# Patient Record
Sex: FEMALE | Race: WHITE | NOT HISPANIC OR LATINO | Employment: FULL TIME | URBAN - METROPOLITAN AREA
[De-identification: names, ages, dates, MRNs, and addresses within clinical notes are randomized per-mention and may not be internally consistent; named-entity substitution may affect disease eponyms.]

---

## 2017-01-01 PROBLEM — L23.1 CONTACT DERMATITIS DUE TO ADHESIVE BANDAGE: Status: ACTIVE | Noted: 2017-01-01

## 2017-01-03 ENCOUNTER — TELEPHONE (OUTPATIENT)
Dept: OBSTETRICS AND GYNECOLOGY | Facility: CLINIC | Age: 32
End: 2017-01-03

## 2017-01-03 NOTE — TELEPHONE ENCOUNTER
Pt delivered via c/s on 12/29. Pt is calling to make 2 week follow up appt in [facility] but nothing is available for Dr. Woodard until the 25th. Pt is requesting to be seen sooner.  [phone]

## 2017-01-05 ENCOUNTER — TELEPHONE (OUTPATIENT)
Dept: LACTATION | Facility: CLINIC | Age: 32
End: 2017-01-05

## 2017-01-05 NOTE — TELEPHONE ENCOUNTER
Lactation note: pt is able to latch baby to breast at every feeding with the use of nipple shield. Pt nurses at least 8 times daily and has more than 5 urine and 3 large stools daily. Pt has been pumping 4 times daily. Baby has ped appointment on 1/6/2016. Pt feels plan is working well and she will continue for now. Lc will call next week to check on progress. Pt has lactation contact number.

## 2017-01-10 ENCOUNTER — TELEPHONE (OUTPATIENT)
Dept: LACTATION | Facility: CLINIC | Age: 32
End: 2017-01-10

## 2017-01-10 NOTE — TELEPHONE ENCOUNTER
"Lactation note: Patient reports baby is latching and nursing well 8 or more feedings/24 hours with use of nipple shield. She states she is "working on" trying to get baby to latch without shield but has not been successful. Baby has > 5 stools and at least 6 urine diapers per 24 hours. Patient is pumping 4 times a day and storing breastmilk. Baby is not being supplemented. At the pediatrician baby weighed 9-0 with clean diaper on only. Birth weight 8-11.3 Suggestions given for weaning baby off of shield. Has warm line number to call for further assistance and questions.   "

## 2017-01-11 ENCOUNTER — POSTPARTUM VISIT (OUTPATIENT)
Dept: OBSTETRICS AND GYNECOLOGY | Facility: CLINIC | Age: 32
End: 2017-01-11
Attending: OBSTETRICS & GYNECOLOGY
Payer: COMMERCIAL

## 2017-01-11 VITALS — SYSTOLIC BLOOD PRESSURE: 118 MMHG | DIASTOLIC BLOOD PRESSURE: 74 MMHG | BODY MASS INDEX: 37.52 KG/M2 | WEIGHT: 239.5 LBS

## 2017-01-11 PROCEDURE — 99999 PR PBB SHADOW E&M-EST. PATIENT-LVL II: CPT | Mod: PBBFAC,,, | Performed by: OBSTETRICS & GYNECOLOGY

## 2017-01-11 PROCEDURE — 99024 POSTOP FOLLOW-UP VISIT: CPT | Mod: S$GLB,,, | Performed by: OBSTETRICS & GYNECOLOGY

## 2017-01-11 NOTE — PROGRESS NOTES
Subjective:   Patient reports no nausea or vomiting.    Activity level: Normal.    Pain control: Well controlled.    Reports some depression. Has a therapist and a psychiatrist already established prior to pregnancy. She has spoken with both of them sicne delivery. No meds have been increased. No SI, no HI. Overall doing better than she was doing right after delivery. No physical complaints, all issues are mental/psych issues. Rarely home alone with the baby.  is around and mother. All questions answered. I do not think she is a threat to the baby or herself. Consider increasing meds pysch    Objective:  Vitals:    17 1453   BP: 118/74     General appearance: Comfortable and well-appearing.    Abdomen: Abdomen is soft, non distended   Tenderness: There is no abdominal tenderness.    Wound:  Clean.  There is no dehiscence.  There is no drainage.      Assessment:   s/p  delivery- 2 week post op  Condition: In stable condition.     Plan:  Encourage ambulation.  Continue wound care.  Pelvic rest for 6 weeks postpartum.    F/u in 2 weeks to reeval depression

## 2017-01-26 ENCOUNTER — POSTPARTUM VISIT (OUTPATIENT)
Dept: OBSTETRICS AND GYNECOLOGY | Facility: CLINIC | Age: 32
End: 2017-01-26
Attending: OBSTETRICS & GYNECOLOGY
Payer: COMMERCIAL

## 2017-01-26 VITALS
SYSTOLIC BLOOD PRESSURE: 122 MMHG | WEIGHT: 240.63 LBS | HEIGHT: 67 IN | BODY MASS INDEX: 37.77 KG/M2 | DIASTOLIC BLOOD PRESSURE: 78 MMHG

## 2017-01-26 PROCEDURE — 99024 POSTOP FOLLOW-UP VISIT: CPT | Mod: S$GLB,,, | Performed by: OBSTETRICS & GYNECOLOGY

## 2017-01-26 PROCEDURE — 99999 PR PBB SHADOW E&M-EST. PATIENT-LVL II: CPT | Mod: PBBFAC,,, | Performed by: OBSTETRICS & GYNECOLOGY

## 2017-01-26 NOTE — MR AVS SNAPSHOT
[facility]  [address]  [address]  [facility] [address]  Phone: [phone]  Fax: [phone]                  Ana Johnson   2017 3:15 PM   Postpartum Visit    Description:  Female : 1985   Provider:  Erica Woodard MD   Department:  [facility]           Reason for Visit     Postpartum Care           Diagnoses this Visit        Comments    Encounter for postpartum visit    -  Primary            To Do List           Future Appointments        Provider Department Dept Phone    2017 1:15 PM Erica [facility]ard MD [facility] [phone]      Goals (5 Years of Data)     None      Follow-Up and Disposition     Return in about 2 weeks (around 2017) for Postpartum visit.      [facility]     [facility] -  Assistance  Registered nurses in the [facility] provide clinical advisement, health education, appointment booking, and other advisory services.  Call for this free service at [phone].             Medications           Message regarding Medications     Verify the changes and/or additions to your medication regime listed below are the same as discussed with your clinician today.  If any of these changes or additions are incorrect, please notify your healthcare provider.             Verify that the below list of medications is an accurate representation of the medications you are currently taking.  If none reported, the list may be blank. If incorrect, please contact your healthcare provider. Carry this list with you in case of emergency.           Current Medications     aripiprazole (ABILIFY) 2 MG Tab Take 3 mg by mouth once daily.     blood sugar diagnostic Strp ONE TOUCH VERIO BLOOD GLUCOSE TEST STRIPS & DELICA LANCETS. CHECK BLOOD SUGARS 4 TIMES A DAY.    blood sugar diagnostic Strp Take blood sugars 4x daily, fasting and 2 hours post-meals    labetalol (NORMODYNE) 100 MG tablet Take 2 tablets  (200 mg total) by mouth 2 (two) times daily.    metformin (GLUCOPHAGE) 500 MG tablet Take 1 tablet (500 mg total) by mouth once daily. At HS    nifedipine 30 MG ORAL TR24 (PROCARDIA-XL) 30 MG (OSM) 24 hr tablet Take 1 tablet (30 mg total) by mouth once daily.    ONETOUCH DELICA LANCETS 33 gauge Misc CHECK 4 TIMES PER DAY    venlafaxine (EFFEXOR-XR) 150 MG Cp24 Take 2 capsules (300 mg total) by mouth every morning.    oxycodone-acetaminophen (PERCOCET) 5-325 mg per tablet Take 1 tablet by mouth every 4 (four) hours as needed.           Clinical Reference Information           Prenatal Vitals     Enc. Date GA Prenatal Vitals Prenatal Pulse Pain Level Urine Albumin/Glucose Edema Presentation Dilation/Effacement/Station    1/26/17 39w2d 122/78 / 109.1 kg (240 lb 10.1 oz)           1/11/17 39w2d 118/74 / 108.7 kg (239 lb 8.5 oz)           12/29/16 39w2d Admission Dept: [facility] L&D    12/28/16 39w1d Admission Dept: [facility]    12/27/16 39w0d  / 120.4 kg (265 lb 5.2 oz)           12/27/16 39w0d 120/84 / 120.4 kg (265 lb 5.2 oz)    Trace / Negative None / None / None Vertex 1 / 50 / -3    12/19/16 37w6d 120/82 / 122.1 kg (269 lb 1.5 oz)  / 154   Negative / Negative       12/15/16 37w2d 112/70   0 Negative / Negative None / None / None      12/8/16 36w2d 114/74 / 121.6 kg (268 lb 1.3 oz)    Negative / Negative   0 / 0 / -3    12/2/16 35w3d 124/80 / 121.1 kg (266 lb 15.6 oz)           12/1/16 35w2d 142/80 (A) / 122.3 kg (269 lb 8.2 oz)    Negative / Negative       11/25/16 34w3d 122/74 / 120.9 kg (266 lb 8.6 oz) 35 cm / 132-144 / Present  0        11/17/16 33w2d 148/82 (A)*                          *BP:  barbara repeat    11/17/16 33w2d 154/82 (A) / 121.6 kg (268 lb 1.3 oz) 34.5 cm / 150 / Present  0 Negative / Negative None / None / None      11/10/16 32w2d 126/78 / 122.2 kg (269 lb 6.4 oz)           11/3/16 31w2d 130/82 / 122 kg (268 lb 15.4 oz)  /  / Present  0 Negative / Negative None / None / None      10/20/16 29w2d  "116/68 / 122 kg (268 lb 15.4 oz) 30 cm / 145 / Present  0 Negative / Negative None / None / None      10/11/16 28w0d Admission Dx: Gestational diabetes mellitus (GDM) Dept: [facility] OB ASSE    10/11/16 28w0d 133/80 / 121.4 kg (267 lb 10.2 oz)           10/5/16 27w1d 132/80 / 122.7 kg (270 lb 6.3 oz) 29 cm / 142   Negative / Negative       10/4/16 27w0d 128/70 / 121.8 kg (268 lb 8.3 oz)  / 142-151 / Present          16 26w0d 122/78 / 122 kg (268 lb 15.4 oz) 28 cm / 130-142*  0                       *Fetal Heart Rate:  Fetal Ultrasound    9/15/16 24w2d 142/62 (A) / 122 kg (269 lb 1.1 oz)  / 147  0 Negative / Negative       16 24w1d 126/72 / 122.4 kg (269 lb 13.5 oz)           16 20w1d 142/84 (A) / 122.4 kg (269 lb 15.3 oz)  / 155  0 Negative / Negative       16 19w0d 124/80 / 121.2 kg (267 lb 3.2 oz)           16 16w1d 124/78 / 121.4 kg (267 lb 10.2 oz)  /  / Absent  0 Negative / Negative None / None / None      16 11w6d 130/78 / 121.1 kg (267 lb)  / 150 / Absent  0 Negative / Negative None / None / None / No      16 8w1d 150/88 (A) / 93.9 kg (207 lb)   0 Negative / Negative          TW.5 kg (58 lb 5.2 oz)   Pregravid weight: 93.9 kg (207 lb)   Number of babies: 1   Height: 5' 7" (1.702 m)   BMI: 32.4       Vital Signs - Last Recorded  Most recent update: 2017  3:23 PM by Shanice Espino MA    BP Ht Wt LMP BMI    122/78 5' 7" (1.702 m) 109.1 kg (240 lb 10.1 oz) 2016 (Exact Date) 37.69 kg/m2      Allergies as of 2017     No Known Allergies      Immunizations Administered on Date of Encounter - 2017     None      "

## 2017-01-26 NOTE — PROGRESS NOTES
Subjective:   Patient reports no nausea or vomiting.    Activity level: Normal.    Pain control: Well controlled.    Reports no postpartum depression, overall doing well.     Objective:  Vitals:    17 1521   BP: 122/78     General appearance: Comfortable and well-appearing.    Abdomen: Abdomen is soft, non distended   Tenderness: There is no abdominal tenderness.    Wound:  Clean.  There is no dehiscence.  There is no drainage.      Assessment:   s/p  delivery- 4 week post op  CHTN- on Labetolol 200 BID and Procardia 30 XL  DM Type 2- on glucophage 500 po QD-- some accuchecks fasting at in 110's-115  Condition: In stable condition.     Plan:  Ok to stop the labetolol and continue the procardia  Increase Glucophage to 500 mg BID  Encourage ambulation.  Continue wound care.  Pelvic rest for 6 weeks postpartum.

## 2017-02-09 ENCOUNTER — POSTPARTUM VISIT (OUTPATIENT)
Dept: OBSTETRICS AND GYNECOLOGY | Facility: CLINIC | Age: 32
End: 2017-02-09
Attending: OBSTETRICS & GYNECOLOGY
Payer: COMMERCIAL

## 2017-02-09 VITALS
BODY MASS INDEX: 37.49 KG/M2 | WEIGHT: 238.88 LBS | SYSTOLIC BLOOD PRESSURE: 118 MMHG | DIASTOLIC BLOOD PRESSURE: 72 MMHG | HEIGHT: 67 IN

## 2017-02-09 PROCEDURE — 0503F POSTPARTUM CARE VISIT: CPT | Mod: S$GLB,,, | Performed by: OBSTETRICS & GYNECOLOGY

## 2017-02-09 PROCEDURE — 99999 PR PBB SHADOW E&M-EST. PATIENT-LVL II: CPT | Mod: PBBFAC,,, | Performed by: OBSTETRICS & GYNECOLOGY

## 2017-02-09 NOTE — MR AVS SNAPSHOT
[facility]  [address]  [address]  [facility] [address]  Phone: [phone]  Fax: [phone]                  Ana Johnson   2017 1:15 PM   Postpartum Visit    Description:  Female : 1985   Provider:  Erica Woodard MD   Department:  [facility]           Reason for Visit     Postpartum Care                To Do List           Future Appointments        Provider Department Dept Phone    2017 3:30 PM Erica Woodard MD [facility] [phone]      Goals (5 Years of Data)     None      [facility]     [facility] Nurse Care Line -  Assistance  Registered nurses in the [facility] Call Center provide clinical advisement, health education, appointment booking, and other advisory services.  Call for this free service at [phone].             Medications           Message regarding Medications     Verify the changes and/or additions to your medication regime listed below are the same as discussed with your clinician today.  If any of these changes or additions are incorrect, please notify your healthcare provider.             Verify that the below list of medications is an accurate representation of the medications you are currently taking.  If none reported, the list may be blank. If incorrect, please contact your healthcare provider. Carry this list with you in case of emergency.           Current Medications     aripiprazole (ABILIFY) 2 MG Tab Take 3 mg by mouth once daily.     blood sugar diagnostic Strp ONE TOUCH VERIO BLOOD GLUCOSE TEST STRIPS & DELICA LANCETS. CHECK BLOOD SUGARS 4 TIMES A DAY.    blood sugar diagnostic Strp Take blood sugars 4x daily, fasting and 2 hours post-meals    labetalol (NORMODYNE) 100 MG tablet Take 2 tablets (200 mg total) by mouth 2 (two) times daily.    metformin (GLUCOPHAGE) 500 MG tablet Take 1 tablet (500 mg total) by mouth once daily. At HS    nifedipine 30 MG ORAL TR24 (PROCARDIA-XL) 30 MG  (OSM) 24 hr tablet Take 1 tablet (30 mg total) by mouth once daily.    ONETOUCH DELICA LANCETS 33 gauge Misc CHECK 4 TIMES PER DAY    oxycodone-acetaminophen (PERCOCET) 5-325 mg per tablet Take 1 tablet by mouth every 4 (four) hours as needed.    venlafaxine (EFFEXOR-XR) 150 MG Cp24 Take 2 capsules (300 mg total) by mouth every morning.           Clinical Reference Information           Prenatal Vitals     Enc. Date GA Prenatal Vitals Prenatal Pulse Pain Level Urine Albumin/Glucose Edema Presentation Dilation/Effacement/Station    2/9/17 39w2d 118/72 / 108.3 kg (238 lb 13.9 oz)           1/26/17 39w2d 122/78 / 109.1 kg (240 lb 10.1 oz)           1/11/17 39w2d 118/74 / 108.7 kg (239 lb 8.5 oz)           12/29/16 39w2d Admission Dept: [facility] L&D    12/28/16 39w1d Admission Dept: [facility]    12/27/16 39w0d  / 120.4 kg (265 lb 5.2 oz)           12/27/16 39w0d 120/84 / 120.4 kg (265 lb 5.2 oz)    Trace / Negative None / None / None Vertex 1 / 50 / -3    12/19/16 37w6d 120/82 / 122.1 kg (269 lb 1.5 oz)  / 154   Negative / Negative       12/15/16 37w2d 112/70   0 Negative / Negative None / None / None      12/8/16 36w2d 114/74 / 121.6 kg (268 lb 1.3 oz)    Negative / Negative   0 / 0 / -3    12/2/16 35w3d 124/80 / 121.1 kg (266 lb 15.6 oz)           12/1/16 35w2d 142/80 (A) / 122.3 kg (269 lb 8.2 oz)    Negative / Negative       11/25/16 34w3d 122/74 / 120.9 kg (266 lb 8.6 oz) 35 cm / 132-144 / Present  0        11/17/16 33w2d 148/82 (A)*                          *BP:  barbara repeat    11/17/16 33w2d 154/82 (A) / 121.6 kg (268 lb 1.3 oz) 34.5 cm / 150 / Present  0 Negative / Negative None / None / None      11/10/16 32w2d 126/78 / 122.2 kg (269 lb 6.4 oz)           11/3/16 31w2d 130/82 / 122 kg (268 lb 15.4 oz)  /  / Present  0 Negative / Negative None / None / None      10/20/16 29w2d 116/68 / 122 kg (268 lb 15.4 oz) 30 cm / 145 / Present  0 Negative / Negative None / None / None      10/11/16 28w0d Admission Dx:  "Gestational diabetes mellitus (GDM) Dept: [facility] OB ASSE    10/11/16 28w0d 133/80 / 121.4 kg (267 lb 10.2 oz)           10/5/16 27w1d 132/80 / 122.7 kg (270 lb 6.3 oz) 29 cm / 142   Negative / Negative       10/4/16 27w0d 128/70 / 121.8 kg (268 lb 8.3 oz)  / 142-151 / Present          16 26w0d 122/78 / 122 kg (268 lb 15.4 oz) 28 cm / 130-142*  0                       *Fetal Heart Rate:  Fetal Ultrasound    9/15/16 24w2d 142/62 (A) / 122 kg (269 lb 1.1 oz)  / 147  0 Negative / Negative       16 24w1d 126/72 / 122.4 kg (269 lb 13.5 oz)           16 20w1d 142/84 (A) / 122.4 kg (269 lb 15.3 oz)  / 155  0 Negative / Negative       16 19w0d 124/80 / 121.2 kg (267 lb 3.2 oz)           16 16w1d 124/78 / 121.4 kg (267 lb 10.2 oz)  /  / Absent  0 Negative / Negative None / None / None      16 11w6d 130/78 / 121.1 kg (267 lb)  / 150 / Absent  0 Negative / Negative None / None / None / No      16 8w1d 150/88 (A) / 93.9 kg (207 lb)   0 Negative / Negative          TW.5 kg (58 lb 5.2 oz)   Pregravid weight: 93.9 kg (207 lb)   Number of babies: 1   Height: 5' 7" (1.702 m)   BMI: 32.4       Your Vitals Were     BP Height Weight Last Period BMI    118/72 5' 7" (1.702 m) 108.3 kg (238 lb 13.9 oz) 2016 (Exact Date) 37.41 kg/m2      Allergies as of 2017     No Known Allergies      Immunizations Administered on Date of Encounter - 2017     None      Language Assistance Services     ATTENTION: Language assistance services are available, free of charge. Please call [phone].      ATENCIÓN: Si habla español, tiene a sheikh disposición servicios gratuitos de asistencia lingüística. Llame al [phone].     CHÚ Ý: N?u b?n nói Ti?ng Vi?t, có các d?ch v? h? tr? ngôn ng? mi?n phí dành cho b?n. G?i s? [phone].         [facility] complies with applicable Federal civil rights laws and does not discriminate on the basis of race, color, national origin, age, disability, or " sex.

## 2017-02-09 NOTE — PROGRESS NOTES
"Subjective:       Ana Johnson is a 31 y.o. female who presents for a postpartum visit. She is 6 weeks postpartum following a . I have fully reviewed the prenatal and intrapartum course. The delivery was at 39 gestational weeks. Anesthesia: epidural. Postpartum course has been uncomplicated. Baby's course has been uncomplicated. Bleeding no bleeding. Bowel function is normal. Bladder function is normal. Patient is not sexually active. Contraception method is none. Postpartum depression screening: negative.      The patient's history were reviewed and updated.    Review of Systems  Review of Systems       Objective:        Visit Vitals    /72    Ht 5' 7" (1.702 m)    Wt 108.3 kg (238 lb 13.9 oz)    LMP 2016 (Exact Date)    Breastfeeding Yes    BMI 37.41 kg/m2      General:  alert and cooperative   Abdomen: soft, non-tender; bowel sounds normal; no masses,  no organomegaly Incision- intact, healing well, no sign of infection      Vulva:  normal   Vagina: normal vagina, no discharge, exudate, lesion, or erythema   Cervix:  no lesions   Corpus: normal size, contour, position, consistency, mobility, non-tender   Adnexa:  no mass, fullness, tenderness   Rectal Exam: Not performed.          Assessment:      6 week postpartum exam. Pap smear not done at today's visit.     Plan:      1. Contraception: none  2. Follow up for annual in 3 months  "

## 2017-02-28 ENCOUNTER — PATIENT MESSAGE (OUTPATIENT)
Dept: OBSTETRICS AND GYNECOLOGY | Facility: CLINIC | Age: 32
End: 2017-02-28

## 2017-03-01 RX ORDER — METFORMIN HYDROCHLORIDE 500 MG/1
500 TABLET ORAL 2 TIMES DAILY WITH MEALS
Qty: 60 TABLET | Refills: 2 | Status: SHIPPED | OUTPATIENT
Start: 2017-03-01 | End: 2017-05-29 | Stop reason: SDUPTHER

## 2017-03-29 ENCOUNTER — PATIENT MESSAGE (OUTPATIENT)
Dept: OBSTETRICS AND GYNECOLOGY | Facility: CLINIC | Age: 32
End: 2017-03-29

## 2017-05-05 ENCOUNTER — OFFICE VISIT (OUTPATIENT)
Dept: INTERNAL MEDICINE | Facility: CLINIC | Age: 32
End: 2017-05-05
Payer: COMMERCIAL

## 2017-05-05 VITALS
SYSTOLIC BLOOD PRESSURE: 140 MMHG | BODY MASS INDEX: 38.44 KG/M2 | TEMPERATURE: 99 F | HEIGHT: 67 IN | HEART RATE: 103 BPM | DIASTOLIC BLOOD PRESSURE: 100 MMHG | WEIGHT: 244.94 LBS | RESPIRATION RATE: 18 BRPM

## 2017-05-05 DIAGNOSIS — R73.09 ELEVATED GLUCOSE: Chronic | ICD-10-CM

## 2017-05-05 DIAGNOSIS — F32.89 OTHER DEPRESSION: Chronic | ICD-10-CM

## 2017-05-05 DIAGNOSIS — Z00.00 ANNUAL PHYSICAL EXAM: Primary | ICD-10-CM

## 2017-05-05 DIAGNOSIS — I10 ESSENTIAL HYPERTENSION: ICD-10-CM

## 2017-05-05 PROCEDURE — 99999 PR PBB SHADOW E&M-EST. PATIENT-LVL III: CPT | Mod: PBBFAC,,, | Performed by: INTERNAL MEDICINE

## 2017-05-05 PROCEDURE — 99395 PREV VISIT EST AGE 18-39: CPT | Mod: S$GLB,,, | Performed by: INTERNAL MEDICINE

## 2017-05-05 RX ORDER — NIFEDIPINE 60 MG/1
60 TABLET, EXTENDED RELEASE ORAL DAILY
Qty: 30 TABLET | Refills: 11 | Status: SHIPPED | OUTPATIENT
Start: 2017-05-05 | End: 2017-05-10 | Stop reason: SDUPTHER

## 2017-05-05 NOTE — PROGRESS NOTES
Subjective:       Patient ID: Ana Johnson is a 31 y.o. female.    Chief Complaint: Annual Exam and recent pregnancy and had gestational diabetes    HPI     31 y.o. female here for annual exam.     Cholesterol: needs  Vaccines: Influenza - done; Tetanus - 2016  Sexual Screening:   STD screening: no concern  Eye exam: glasses; done last 2 yrs ago  Mammogram: no family history of cancer  Gyn exam: UTD  Colonoscopy: no family history of cancer  A1c: needs    Exercise: She walks three times a week.  Diet:  Mostly home cooked.  Tries to get her veges in.  Has been constipated since her pregnancy.  Drinks coffee and water.    Constipation - happens every couple of days.  Before she was pregnant, she was once a day.  She was exercising more before she got pregnant.  She was taking a stool softener.    HTN - Patient is currently on procardia XL 30 mg. She does not check her BP at home. Side effects of medications note: none. Denies headaches, blurred vision, chest pain, shortness of breath, nausea.    Past Medical History:   Diagnosis Date    Depression     stable Venlafaxine 300 mg qd, Celexa & Zoloft & Buspar & Wellbutrin did not help, Abilify did help as an additive    Gestational diabetes     History of anorexia nervosa     high school    History of eating disorder     binge    HLD (hyperlipidemia) 2014    HTN (hypertension)     IBS (irritable bowel syndrome)     Irregular menses 2014    Obesity     Urticaria, cholinergic     worse with stress, Zyrtec prn     Past Surgical History:   Procedure Laterality Date     SECTION       Social History     Social History    Marital status:      Spouse name: N/A    Number of children: N/A    Years of education: N/A     Occupational History    Not on file.     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, aline Butt, , 1 child, nonsmoker, ETOH socially, GYN Ronni, previous pap smears normal     Review of patient's allergies indicates:  No Known Allergies  Ms. Johnson had no medications administered during this visit.    Review of Systems   Constitutional: Negative for chills, fever and unexpected weight change.   HENT: Negative for congestion, postnasal drip and sore throat.    Eyes: Negative for redness and visual disturbance.   Respiratory: Negative for cough and shortness of breath.    Cardiovascular: Negative for chest pain and palpitations.   Gastrointestinal: Positive for constipation. Negative for abdominal pain, diarrhea, nausea and vomiting.   Genitourinary: Negative for dysuria, frequency and hematuria.   Musculoskeletal: Negative for arthralgias and myalgias.   Skin: Negative for color change and rash.   Neurological: Negative for dizziness and headaches.       Objective:      Physical Exam   Constitutional: She is oriented to person, place, and time. She appears well-developed and well-nourished.   HENT:   Head: Normocephalic and atraumatic.   Mouth/Throat: No oropharyngeal exudate.   Eyes: EOM are normal. Pupils are equal, round, and reactive to light. Right eye exhibits no discharge. Left eye exhibits no discharge. No scleral icterus.   Neck: Normal range of motion. Neck supple. No tracheal deviation present. No thyromegaly present.   Cardiovascular: Normal rate, regular rhythm and normal heart sounds.  Exam reveals no gallop and no friction rub.    No murmur heard.  Pulmonary/Chest: Effort normal and breath sounds normal. No respiratory distress. She has no wheezes. She has no rales. She exhibits no tenderness.   Abdominal: Soft. Bowel sounds are  normal. She exhibits no distension and no mass. There is no tenderness. There is no rebound and no guarding.   Musculoskeletal: Normal range of motion. She exhibits no edema or tenderness.   Neurological: She is alert and oriented to person, place, and time.   Skin: Skin is warm and dry. No rash noted. No erythema. No pallor.   Psychiatric: She has a normal mood and affect. Her behavior is normal.   Vitals reviewed.      Assessment:       1. Annual physical exam    2. Essential hypertension    3. Elevated glucose    4. Gestational diabetes mellitus, delivered, current hospitalization    5. Other depression        Plan:       1.  Check CBC, CMP, TSH, lipids, A1c.  Discussed exercise with patient.  Up-to-date on vaccines.  No need for colonoscopy or mammogram yet.  Up-to-date GYN screening.  2.  Increase Procardia to 60 mg daily.  3/4.  Check A1c.  Hold metformin for now.  Check blood sugars once a day in the morning.  Bring log in 4 weeks and call in 2 weeks.  5.  Continue Abilify 2 mg    Return to clinic in 4 weeks.

## 2017-05-10 RX ORDER — NIFEDIPINE 60 MG/1
60 TABLET, EXTENDED RELEASE ORAL DAILY
Qty: 30 TABLET | Refills: 1 | Status: SHIPPED | OUTPATIENT
Start: 2017-05-10 | End: 2018-04-16 | Stop reason: SDUPTHER

## 2017-05-10 NOTE — TELEPHONE ENCOUNTER
Vance pt requesting refill of procardia. Scheduled to see Dr. Woodard on 5/31. Allergies and pharmacy are up to date.

## 2017-05-11 ENCOUNTER — TELEPHONE (OUTPATIENT)
Dept: INTERNAL MEDICINE | Facility: CLINIC | Age: 32
End: 2017-05-11

## 2017-05-11 ENCOUNTER — PATIENT MESSAGE (OUTPATIENT)
Dept: INTERNAL MEDICINE | Facility: CLINIC | Age: 32
End: 2017-05-11

## 2017-05-11 ENCOUNTER — LAB VISIT (OUTPATIENT)
Dept: LAB | Facility: HOSPITAL | Age: 32
End: 2017-05-11
Attending: INTERNAL MEDICINE
Payer: COMMERCIAL

## 2017-05-11 DIAGNOSIS — Z00.00 ANNUAL PHYSICAL EXAM: ICD-10-CM

## 2017-05-11 DIAGNOSIS — D58.2 ELEVATED HEMOGLOBIN: Primary | ICD-10-CM

## 2017-05-11 LAB
ALBUMIN SERPL BCP-MCNC: 3.8 G/DL
ALP SERPL-CCNC: 87 U/L
ALT SERPL W/O P-5'-P-CCNC: 28 U/L
ANION GAP SERPL CALC-SCNC: 10 MMOL/L
AST SERPL-CCNC: 17 U/L
BASOPHILS # BLD AUTO: 0 K/UL
BASOPHILS NFR BLD: 0 %
BILIRUB SERPL-MCNC: 0.6 MG/DL
BUN SERPL-MCNC: 18 MG/DL
CALCIUM SERPL-MCNC: 9.2 MG/DL
CHLORIDE SERPL-SCNC: 103 MMOL/L
CHOLEST/HDLC SERPL: 3.9 {RATIO}
CO2 SERPL-SCNC: 25 MMOL/L
CREAT SERPL-MCNC: 0.9 MG/DL
DIFFERENTIAL METHOD: ABNORMAL
EOSINOPHIL # BLD AUTO: 0 K/UL
EOSINOPHIL NFR BLD: 0 %
ERYTHROCYTE [DISTWIDTH] IN BLOOD BY AUTOMATED COUNT: 14.7 %
EST. GFR  (AFRICAN AMERICAN): >60 ML/MIN/1.73 M^2
EST. GFR  (NON AFRICAN AMERICAN): >60 ML/MIN/1.73 M^2
ESTIMATED AVG GLUCOSE: 120 MG/DL
GLUCOSE SERPL-MCNC: 101 MG/DL
HBA1C MFR BLD HPLC: 5.8 %
HCT VFR BLD AUTO: 47.3 %
HDL/CHOLESTEROL RATIO: 25.7 %
HDLC SERPL-MCNC: 230 MG/DL
HDLC SERPL-MCNC: 59 MG/DL
HGB BLD-MCNC: 16.7 G/DL
LDLC SERPL CALC-MCNC: 145.8 MG/DL
LYMPHOCYTES # BLD AUTO: 1.2 K/UL
LYMPHOCYTES NFR BLD: 12.5 %
MCH RBC QN AUTO: 27.8 PG
MCHC RBC AUTO-ENTMCNC: 35.3 %
MCV RBC AUTO: 79 FL
MONOCYTES # BLD AUTO: 0.7 K/UL
MONOCYTES NFR BLD: 7.9 %
NEUTROPHILS # BLD AUTO: 7.3 K/UL
NEUTROPHILS NFR BLD: 79.5 %
NONHDLC SERPL-MCNC: 171 MG/DL
PLATELET # BLD AUTO: 260 K/UL
PMV BLD AUTO: 9.2 FL
POTASSIUM SERPL-SCNC: 4.4 MMOL/L
PROT SERPL-MCNC: 7.5 G/DL
RBC # BLD AUTO: 6 M/UL
SODIUM SERPL-SCNC: 138 MMOL/L
TRIGL SERPL-MCNC: 126 MG/DL
TSH SERPL DL<=0.005 MIU/L-ACNC: 1.37 UIU/ML
WBC # BLD AUTO: 9.23 K/UL

## 2017-05-11 PROCEDURE — 80053 COMPREHEN METABOLIC PANEL: CPT

## 2017-05-11 PROCEDURE — 84443 ASSAY THYROID STIM HORMONE: CPT

## 2017-05-11 PROCEDURE — 36415 COLL VENOUS BLD VENIPUNCTURE: CPT | Mod: PO

## 2017-05-11 PROCEDURE — 85025 COMPLETE CBC W/AUTO DIFF WBC: CPT

## 2017-05-11 PROCEDURE — 80061 LIPID PANEL: CPT

## 2017-05-11 PROCEDURE — 83036 HEMOGLOBIN GLYCOSYLATED A1C: CPT

## 2017-05-11 NOTE — TELEPHONE ENCOUNTER
Thyroid function, electrolytes, kidney/liver function are normal.  Blood counts appear to be concentrated from prior check 4 months ago.  Cholesterol is higher than last year.  Nothing to medicate yet.  Please continue with exercise and try to get 2 more days a week.  As far as the blood counts, I would like to recheck in a week.  Released to patient portal.

## 2017-05-19 ENCOUNTER — LAB VISIT (OUTPATIENT)
Dept: LAB | Facility: HOSPITAL | Age: 32
End: 2017-05-19
Attending: INTERNAL MEDICINE
Payer: COMMERCIAL

## 2017-05-19 DIAGNOSIS — D58.2 ELEVATED HEMOGLOBIN: ICD-10-CM

## 2017-05-19 LAB
BASOPHILS # BLD AUTO: 0.02 K/UL
BASOPHILS NFR BLD: 0.2 %
DIFFERENTIAL METHOD: ABNORMAL
EOSINOPHIL # BLD AUTO: 0 K/UL
EOSINOPHIL NFR BLD: 0 %
ERYTHROCYTE [DISTWIDTH] IN BLOOD BY AUTOMATED COUNT: 14.5 %
HCT VFR BLD AUTO: 45.1 %
HGB BLD-MCNC: 15.3 G/DL
LYMPHOCYTES # BLD AUTO: 3.1 K/UL
LYMPHOCYTES NFR BLD: 29.1 %
MCH RBC QN AUTO: 27.3 PG
MCHC RBC AUTO-ENTMCNC: 33.9 %
MCV RBC AUTO: 80 FL
MONOCYTES # BLD AUTO: 0.7 K/UL
MONOCYTES NFR BLD: 6.8 %
NEUTROPHILS # BLD AUTO: 6.7 K/UL
NEUTROPHILS NFR BLD: 63.6 %
PLATELET # BLD AUTO: 287 K/UL
PMV BLD AUTO: 9.3 FL
RBC # BLD AUTO: 5.61 M/UL
WBC # BLD AUTO: 10.51 K/UL

## 2017-05-19 PROCEDURE — 85025 COMPLETE CBC W/AUTO DIFF WBC: CPT

## 2017-05-19 PROCEDURE — 36415 COLL VENOUS BLD VENIPUNCTURE: CPT | Mod: PO

## 2017-05-29 RX ORDER — METFORMIN HYDROCHLORIDE 500 MG/1
500 TABLET ORAL 2 TIMES DAILY WITH MEALS
Qty: 60 TABLET | Refills: 0 | Status: SHIPPED | OUTPATIENT
Start: 2017-05-29 | End: 2017-06-29 | Stop reason: SDUPTHER

## 2017-05-29 NOTE — TELEPHONE ENCOUNTER
Call pt. Tell her I sent in 1 month's worth of refills but in the future she should get the metformin from her PCP now that she is not pregnant anymore.

## 2017-05-31 ENCOUNTER — OFFICE VISIT (OUTPATIENT)
Dept: OBSTETRICS AND GYNECOLOGY | Facility: CLINIC | Age: 32
End: 2017-05-31
Attending: OBSTETRICS & GYNECOLOGY
Payer: COMMERCIAL

## 2017-05-31 VITALS
SYSTOLIC BLOOD PRESSURE: 136 MMHG | DIASTOLIC BLOOD PRESSURE: 74 MMHG | BODY MASS INDEX: 38.58 KG/M2 | HEIGHT: 67 IN | WEIGHT: 245.81 LBS

## 2017-05-31 DIAGNOSIS — Z01.419 ENCOUNTER FOR GYNECOLOGICAL EXAMINATION (GENERAL) (ROUTINE) WITHOUT ABNORMAL FINDINGS: ICD-10-CM

## 2017-05-31 DIAGNOSIS — Z11.51 ENCOUNTER FOR SCREENING FOR HUMAN PAPILLOMAVIRUS (HPV): ICD-10-CM

## 2017-05-31 DIAGNOSIS — Z12.4 ENCOUNTER FOR PAPANICOLAOU SMEAR FOR CERVICAL CANCER SCREENING: Primary | ICD-10-CM

## 2017-05-31 PROCEDURE — 87624 HPV HI-RISK TYP POOLED RSLT: CPT

## 2017-05-31 PROCEDURE — 88175 CYTOPATH C/V AUTO FLUID REDO: CPT

## 2017-05-31 PROCEDURE — 99999 PR PBB SHADOW E&M-EST. PATIENT-LVL III: CPT | Mod: PBBFAC,,, | Performed by: OBSTETRICS & GYNECOLOGY

## 2017-05-31 PROCEDURE — 99395 PREV VISIT EST AGE 18-39: CPT | Mod: S$GLB,,, | Performed by: OBSTETRICS & GYNECOLOGY

## 2017-06-01 ENCOUNTER — PATIENT MESSAGE (OUTPATIENT)
Dept: INTERNAL MEDICINE | Facility: CLINIC | Age: 32
End: 2017-06-01

## 2017-06-02 NOTE — PROGRESS NOTES
Subjective:       Patient ID: Ana Johnson is a 31 y.o. female.    Chief Complaint:  Annual Exam (last pap 16 normal)      Patient Active Problem List   Diagnosis    HLD (hyperlipidemia)    Irregular menses    IBS (irritable bowel syndrome)    Essential hypertension    BMI 40.0-44.9, adult    Metatarsalgia    Depression    Arrested active phase of labor    Contact dermatitis due to adhesive bandage       History of Present Illness  31 y.o. yo  here for annual exam. No gyn complaints. Doing well. Overall doing much better as far as depression. Declines birth control for now. Still seeing psychiatrist. PCP taking her off some meds like glucophage      Past Medical History:   Diagnosis Date    Depression     stable Venlafaxine 300 mg qd, Celexa & Zoloft & Buspar & Wellbutrin did not help, Abilify did help as an additive    Gestational diabetes     History of anorexia nervosa     high school    History of eating disorder     binge    HLD (hyperlipidemia) 2014    HTN (hypertension)     IBS (irritable bowel syndrome)     Irregular menses 2014    Obesity     Urticaria, cholinergic     worse with stress, Zyrtec prn       Past Surgical History:   Procedure Laterality Date     SECTION         OB History    Para Term  AB Living   1 1 1 0 0 1   SAB TAB Ectopic Multiple Live Births   0 0 0 0 1      # Outcome Date GA Lbr Tommy/2nd Weight Sex Delivery Anes PTL Lv   1 Term 16 39w2d  3.95 kg (8 lb 11.3 oz) M CS-LTranv EPI N VENANCIO      Complications: Failure to Progress in First Stage          No LMP recorded.   Date of Last Pap: 2017    Review of Systems  Review of Systems   Constitutional: Negative for fatigue and unexpected weight change.   Respiratory: Negative for shortness of breath.    Cardiovascular: Negative for chest pain.   Gastrointestinal: Negative for abdominal pain, constipation, diarrhea, nausea and vomiting.   Genitourinary: Negative for  dysuria.   Musculoskeletal: Negative for back pain.   Skin: Negative for rash.   Neurological: Negative for headaches.   Hematological: Does not bruise/bleed easily.   Psychiatric/Behavioral: Negative for behavioral problems.        Objective:   Physical Exam:   Constitutional: She is oriented to person, place, and time. Vital signs are normal. She appears well-developed and well-nourished. No distress.        Pulmonary/Chest: She exhibits no mass. Right breast exhibits no mass, no nipple discharge, no skin change, no tenderness, no bleeding and no swelling. Left breast exhibits no mass, no nipple discharge, no skin change, no tenderness, no bleeding and no swelling. Breasts are symmetrical.        Abdominal: Soft. Normal appearance and bowel sounds are normal. She exhibits no distension and no mass. There is no tenderness. There is no rebound.     Genitourinary: Vagina normal and uterus normal. There is no rash, tenderness, lesion or injury on the right labia. There is no rash, tenderness, lesion or injury on the left labia. Uterus is not deviated, not enlarged, not fixed, not tender, not hosting fibroids and not experiencing uterine prolapse. Cervix is normal. Right adnexum displays no mass, no tenderness and no fullness. Left adnexum displays no mass, no tenderness and no fullness. No erythema, tenderness, rectocele, cystocele or unspecified prolapse of vaginal walls in the vagina. No vaginal discharge found. Cervix exhibits no motion tenderness, no discharge and no friability.           Musculoskeletal: Normal range of motion and moves all extremeties.      Lymphadenopathy:     She has no axillary adenopathy.        Right: No supraclavicular adenopathy present.        Left: No supraclavicular adenopathy present.    Neurological: She is alert and oriented to person, place, and time.    Skin: Skin is warm and dry.    Psychiatric: She has a normal mood and affect. Her behavior is normal. Judgment normal.         Assessment/ Plan:     1. Encounter for Papanicolaou smear for cervical cancer screening  Liquid-based pap smear, screening   2. Encounter for screening for human papillomavirus (HPV)  HPV High Risk Genotypes, PCR   3. Encounter for gynecological examination (general) (routine) without abnormal findings         Follow-up with me in 1 year

## 2017-06-06 LAB
HPV HR 12 DNA CVX QL NAA+PROBE: NEGATIVE
HPV16 DNA SPEC QL NAA+PROBE: NEGATIVE
HPV18 DNA SPEC QL NAA+PROBE: NEGATIVE

## 2017-06-29 RX ORDER — METFORMIN HYDROCHLORIDE 500 MG/1
TABLET ORAL
Qty: 60 TABLET | Refills: 0 | Status: SHIPPED | OUTPATIENT
Start: 2017-06-29 | End: 2018-04-16

## 2017-07-31 ENCOUNTER — HOSPITAL ENCOUNTER (OUTPATIENT)
Dept: RADIOLOGY | Facility: HOSPITAL | Age: 32
Discharge: HOME | End: 2017-07-31
Attending: INTERNAL MEDICINE
Payer: COMMERCIAL

## 2017-07-31 ENCOUNTER — NURSE TRIAGE (OUTPATIENT)
Dept: ADMINISTRATIVE | Facility: CLINIC | Age: 32
End: 2017-07-31

## 2017-07-31 ENCOUNTER — TELEPHONE (OUTPATIENT)
Dept: INTERNAL MEDICINE | Facility: CLINIC | Age: 32
End: 2017-07-31

## 2017-07-31 ENCOUNTER — OFFICE VISIT (OUTPATIENT)
Dept: INTERNAL MEDICINE | Facility: CLINIC | Age: 32
End: 2017-07-31
Payer: COMMERCIAL

## 2017-07-31 VITALS
SYSTOLIC BLOOD PRESSURE: 127 MMHG | DIASTOLIC BLOOD PRESSURE: 90 MMHG | TEMPERATURE: 98 F | HEART RATE: 90 BPM | HEIGHT: 67 IN | RESPIRATION RATE: 16 BRPM | WEIGHT: 240.31 LBS | BODY MASS INDEX: 37.72 KG/M2

## 2017-07-31 DIAGNOSIS — J01.00 ACUTE NON-RECURRENT MAXILLARY SINUSITIS: ICD-10-CM

## 2017-07-31 DIAGNOSIS — J18.9 PNEUMONIA DUE TO INFECTIOUS ORGANISM, UNSPECIFIED LATERALITY, UNSPECIFIED PART OF LUNG: Primary | ICD-10-CM

## 2017-07-31 DIAGNOSIS — I10 ESSENTIAL HYPERTENSION: ICD-10-CM

## 2017-07-31 DIAGNOSIS — J20.9 ACUTE BRONCHITIS, UNSPECIFIED ORGANISM: ICD-10-CM

## 2017-07-31 DIAGNOSIS — J01.00 ACUTE NON-RECURRENT MAXILLARY SINUSITIS: Primary | ICD-10-CM

## 2017-07-31 PROCEDURE — 71020 XR CHEST PA AND LATERAL: CPT | Mod: TC,PO

## 2017-07-31 PROCEDURE — 99214 OFFICE O/P EST MOD 30 MIN: CPT | Mod: S$GLB,,, | Performed by: INTERNAL MEDICINE

## 2017-07-31 PROCEDURE — 99999 PR PBB SHADOW E&M-EST. PATIENT-LVL III: CPT | Mod: PBBFAC,,, | Performed by: INTERNAL MEDICINE

## 2017-07-31 PROCEDURE — 71020 XR CHEST PA AND LATERAL: CPT | Mod: 26,,, | Performed by: RADIOLOGY

## 2017-07-31 RX ORDER — AMOXICILLIN AND CLAVULANATE POTASSIUM 875; 125 MG/1; MG/1
1 TABLET, FILM COATED ORAL 2 TIMES DAILY
Qty: 14 TABLET | Refills: 0 | Status: SHIPPED | OUTPATIENT
Start: 2017-07-31 | End: 2017-08-07

## 2017-07-31 RX ORDER — AMOXICILLIN 875 MG/1
875 TABLET, FILM COATED ORAL 2 TIMES DAILY
Qty: 14 TABLET | Refills: 0 | Status: SHIPPED | OUTPATIENT
Start: 2017-07-31 | End: 2017-07-31

## 2017-07-31 NOTE — PROGRESS NOTES
Subjective:       Patient ID: Ana Johnson is a 32 y.o. female.    Chief Complaint: Cough    HPI   Pt with HTN who is currently breastfeeding is c/o 10 days of worsening sinus/chest congestion, productive cough, post nasal drip, wheezing/SOB. Minimal relief with Robitussin and Advil.   Review of Systems   Constitutional: Negative for activity change, appetite change, chills, diaphoresis, fatigue, fever and unexpected weight change.   HENT: Positive for congestion, postnasal drip, rhinorrhea and sinus pressure. Negative for sneezing, sore throat, trouble swallowing and voice change.    Respiratory: Positive for choking, shortness of breath and wheezing. Negative for cough.    Cardiovascular: Negative for chest pain, palpitations and leg swelling.   Gastrointestinal: Negative for abdominal pain, blood in stool, constipation, diarrhea, nausea and vomiting.   Genitourinary: Negative for dysuria.   Musculoskeletal: Negative for arthralgias and myalgias.   Skin: Negative for rash and wound.   Allergic/Immunologic: Negative for environmental allergies and food allergies.   Hematological: Negative for adenopathy. Does not bruise/bleed easily.       Objective:      Physical Exam   Constitutional: She is oriented to person, place, and time. She appears well-developed and well-nourished. No distress.   HENT:   Head: Normocephalic and atraumatic.   Right Ear: External ear normal.   Left Ear: External ear normal.   Nose: Mucosal edema and rhinorrhea present. Right sinus exhibits maxillary sinus tenderness. Left sinus exhibits maxillary sinus tenderness.   Mouth/Throat: Oropharynx is clear and moist. No oropharyngeal exudate.   Eyes: Conjunctivae and EOM are normal. Pupils are equal, round, and reactive to light. Right eye exhibits no discharge. Left eye exhibits no discharge. No scleral icterus.   Neck: Neck supple. No JVD present.   Cardiovascular: Normal rate, regular rhythm, normal heart sounds and intact distal  pulses.    Pulmonary/Chest: Effort normal. No respiratory distress. She has wheezes (trace). She has no rales.   Musculoskeletal: She exhibits no edema.   Lymphadenopathy:     She has no cervical adenopathy.   Neurological: She is alert and oriented to person, place, and time.   Skin: Skin is warm and dry. No rash noted. She is not diaphoretic. No pallor.       Assessment:       1. Acute non-recurrent maxillary sinusitis    2. Acute bronchitis, unspecified organism    3. Essential hypertension        Plan:    1/2. CXR          Rx Amoxicillin 875 mg BID x 7 days           No decongestants 2/2 HTN   3. Stable, CPT

## 2017-07-31 NOTE — TELEPHONE ENCOUNTER
Pt reports cough for over a week. She was originally running fever with it but denies it now. Also reports some shortness of breath with coughing or exertion. States tht the coughing spells have gotten progressively worse and sometimes they cause her to gag. No respiratory distress noted on the phone. Scheduled to see MD this morning    Reason for Disposition   Increasing ankle swelling    Protocols used: ST COUGH-A-OH

## 2017-07-31 NOTE — TELEPHONE ENCOUNTER
I called [facility] and spoke to pharmacist and cancelled the amoxil.    Patient reached and told her dx and what rx to take.  We set up cxr in a week.  I also did letter for work with dr ray's approval. Released online.    I told her to call if not improving.

## 2017-08-01 ENCOUNTER — PATIENT MESSAGE (OUTPATIENT)
Dept: INTERNAL MEDICINE | Facility: CLINIC | Age: 32
End: 2017-08-01

## 2017-08-07 ENCOUNTER — TELEPHONE (OUTPATIENT)
Dept: INTERNAL MEDICINE | Facility: CLINIC | Age: 32
End: 2017-08-07

## 2017-08-07 ENCOUNTER — HOSPITAL ENCOUNTER (OUTPATIENT)
Dept: RADIOLOGY | Facility: HOSPITAL | Age: 32
Discharge: HOME | End: 2017-08-07
Attending: INTERNAL MEDICINE
Payer: COMMERCIAL

## 2017-08-07 DIAGNOSIS — J18.9 PNEUMONIA DUE TO INFECTIOUS ORGANISM, UNSPECIFIED LATERALITY, UNSPECIFIED PART OF LUNG: ICD-10-CM

## 2017-08-07 PROCEDURE — 71020 XR CHEST PA AND LATERAL: CPT | Mod: 26,,, | Performed by: RADIOLOGY

## 2017-08-07 PROCEDURE — 71020 XR CHEST PA AND LATERAL: CPT | Mod: TC,PO

## 2017-08-07 NOTE — TELEPHONE ENCOUNTER
----- Message from Gwyn Sanders DO sent at 8/7/2017 10:09 AM CDT -----  Repeat Chest X-ray shows improvement of the pneumonia   No need for further imaging unless your symptoms worsen- if so, come back in for evaluation

## 2017-11-19 ENCOUNTER — OFFICE VISIT (OUTPATIENT)
Dept: URGENT CARE | Facility: CLINIC | Age: 32
End: 2017-11-19
Payer: COMMERCIAL

## 2017-11-19 VITALS
TEMPERATURE: 98 F | SYSTOLIC BLOOD PRESSURE: 133 MMHG | DIASTOLIC BLOOD PRESSURE: 91 MMHG | HEART RATE: 98 BPM | RESPIRATION RATE: 18 BRPM | BODY MASS INDEX: 37.67 KG/M2 | WEIGHT: 240 LBS | OXYGEN SATURATION: 97 % | HEIGHT: 67 IN

## 2017-11-19 DIAGNOSIS — N39.0 URINARY TRACT INFECTION WITH HEMATURIA, SITE UNSPECIFIED: Primary | ICD-10-CM

## 2017-11-19 DIAGNOSIS — R31.9 URINARY TRACT INFECTION WITH HEMATURIA, SITE UNSPECIFIED: Primary | ICD-10-CM

## 2017-11-19 DIAGNOSIS — R39.15 URINARY URGENCY: ICD-10-CM

## 2017-11-19 LAB
BILIRUB UR QL STRIP: NEGATIVE
GLUCOSE UR QL STRIP: NEGATIVE
KETONES UR QL STRIP: NEGATIVE
LEUKOCYTE ESTERASE UR QL STRIP: POSITIVE
PH, POC UA: 7.5 (ref 5–8)
POC BLOOD, URINE: POSITIVE
POC NITRATES, URINE: POSITIVE
PROT UR QL STRIP: POSITIVE
SP GR UR STRIP: 1.01 (ref 1–1.03)
UROBILINOGEN UR STRIP-ACNC: ABNORMAL (ref 0.1–1.1)

## 2017-11-19 PROCEDURE — 81003 URINALYSIS AUTO W/O SCOPE: CPT | Mod: QW,S$GLB,, | Performed by: NURSE PRACTITIONER

## 2017-11-19 PROCEDURE — 99214 OFFICE O/P EST MOD 30 MIN: CPT | Mod: 25,S$GLB,, | Performed by: NURSE PRACTITIONER

## 2017-11-19 RX ORDER — NITROFURANTOIN 25; 75 MG/1; MG/1
100 CAPSULE ORAL 2 TIMES DAILY
Qty: 14 CAPSULE | Refills: 0 | Status: SHIPPED | OUTPATIENT
Start: 2017-11-19 | End: 2017-11-26

## 2017-11-19 NOTE — PATIENT INSTRUCTIONS
Please return here or go to the Emergency Department for any concerns or worsening of condition.  If you were prescribed antibiotics, please take them to completion.  If you were prescribed a narcotic medication, do not drive or operate heavy equipment or machinery while taking these medications.  Please follow up with your primary care doctor or specialist as needed.  Please drink plenty of fluids.  Please get plenty of rest.  If you were prescribed Pyridium (phenazopyridine), please be aware that if you wear contact lens that this medication may stain your contacts.  While taking this medication it is recommended that you do not wear your contacts until 24 hours after your last dose.  Please follow up with your primary care doctor or specialist as needed.    If you  smoke, please stop smoking.  Urinary Tract Infections in Women    Urinary tract infections (UTIs) are most often caused by bacteria (germs). These bacteria enter the urinary tract. The bacteria may come from outside the body. Or they may travel from the skin outside the rectum or vagina into the urethra. Female anatomy makes it easy for bacteria from the bowel to enter a womans urinary tract, which is the most common source of UTI. This means women develop UTIs more often than men. Pain in or around the urinary tract is a common UTI symptom. But the only way to know for sure if you have a UTI for the healthcare provider to test your urine. The two tests that may be done are the urinalysis and urine culture.  Types of UTIs  · Cystitis: A bladder infection (cystitis) is the most common UTI in women. You may have urgent or frequent urination. You may also have pain, burning when you urinate, and bloody urine.  · Urethritis: This is an inflamed urethra, which is the tube that carries urine from the bladder to outside the body. You may have lower stomach or back pain. You may also have urgent or frequent urination.  · Pyelonephritis: This is a kidney  infection. If not treated, it can be serious and damage your kidneys. In severe cases, you may be hospitalized. You may have a fever and lower back pain.  Medicines to treat a UTI  Most UTIs are treated with antibiotics. These kill the bacteria. The length of time you need to take them depends on the type of infection. It may be as short as 3 days. If you have repeated UTIs, a low-dose antibiotic may be needed for several months. Take antibiotics exactly as directed. Dont stop taking them until all of the medicine is gone. If you stop taking the antibiotic too soon, the infection may not go away, and you may develop a resistance to the antibiotic. This can make it much harder to treat.  Lifestyle changes to treat and prevent UTIs  The lifestyle changes below will help get rid of your UTI. They may also help prevent future UTIs.  · Drink plenty of fluids. This includes water, juice, or other caffeine-free drinks. Fluids help flush bacteria out of your body.  · Empty your bladder. Always empty your bladder when you feel the urge to urinate. And always urinate before going to sleep. Urine that stays in your bladder can lead to infection. Try to urinate before and after sex as well.  · Practice good personal hygiene. Wipe yourself from front to back after using the toilet. This helps keep bacteria from getting into the urethra.  · Use condoms during sex. These help prevent UTIs caused by sexually transmitted bacteria. Also, avoid using spermicides during sex. These can increase the risk of UTIs. Choose other forms of birth control instead. For women who tend to get UTIs after sex, a low-dose of a preventive antibiotic may be used. Be sure to discuss this option with your healthcare provider.  · Follow up with your healthcare provider as directed. He or she may test to make sure the infection has cleared. If needed, more treatment may be started.  Date Last Reviewed: 1/1/2017  © 5952-5537 [employer] [address]  [place] [address]. All rights reserved. This information is not intended as a substitute for professional medical care. Always follow your healthcare professional's instructions.

## 2017-11-19 NOTE — PROGRESS NOTES
"Subjective:       Patient ID: Ana Johnson is a 32 y.o. female.    Vitals:  height is 5' 7" (1.702 m) and weight is 108.9 kg (240 lb). Her temperature is 97.8 °F (36.6 °C). Her blood pressure is 133/91 (abnormal) and her pulse is 98. Her respiration is 18 and oxygen saturation is 97%.     Chief Complaint: Urinary Urgency (urgency and pain since early this morning.)    Patient is currently breastfeeding.      Review of Systems   Constitution: Negative for chills and fever.   Skin: Negative for itching.   Musculoskeletal: Negative for back pain.   Gastrointestinal: Positive for abdominal pain. Negative for nausea and vomiting.   Genitourinary: Positive for frequency and urgency. Negative for dysuria, genital sores, hematuria, missed menses and non-menstrual bleeding.   All other systems reviewed and are negative.      Objective:      Physical Exam   Constitutional: She is oriented to person, place, and time. She appears well-developed and well-nourished.   HENT:   Head: Normocephalic and atraumatic.   Right Ear: External ear normal.   Left Ear: External ear normal.   Nose: Nose normal. No nasal deformity. No epistaxis.   Mouth/Throat: Oropharynx is clear and moist and mucous membranes are normal.   Eyes: Conjunctivae and lids are normal.   Neck: Trachea normal, normal range of motion and phonation normal. Neck supple.   Cardiovascular: Normal rate, regular rhythm, normal heart sounds and normal pulses.    Pulmonary/Chest: Effort normal and breath sounds normal.   Abdominal: Soft. Normal appearance and bowel sounds are normal. She exhibits no distension and no mass. There is no tenderness. There is no CVA tenderness.   Neurological: She is alert and oriented to person, place, and time.   Skin: Skin is warm, dry and intact.   Psychiatric: She has a normal mood and affect. Her speech is normal and behavior is normal. Cognition and memory are normal.   Nursing note and vitals reviewed.      Assessment:       1. " Urinary tract infection with hematuria, site unspecified    2. Urinary urgency        Plan:         Urinary tract infection with hematuria, site unspecified  -     Urine culture  -     nitrofurantoin, macrocrystal-monohydrate, (MACROBID) 100 MG capsule; Take 1 capsule (100 mg total) by mouth 2 (two) times daily.  Dispense: 14 capsule; Refill: 0  -     methen-m.blue-s.phos-phsal-hyo (URIBEL) 118-10-40.8-36 mg Cap; Take 1 capsule by mouth 4 (four) times daily as needed (Urinary pain).  Dispense: 20 capsule; Refill: 0    Urinary urgency  -     POCT Urinalysis, Dipstick, Automated, W/O Scope      Patient Instructions   Please return here or go to the Emergency Department for any concerns or worsening of condition.  If you were prescribed antibiotics, please take them to completion.  If you were prescribed a narcotic medication, do not drive or operate heavy equipment or machinery while taking these medications.  Please follow up with your primary care doctor or specialist as needed.  Please drink plenty of fluids.  Please get plenty of rest.  If you were prescribed Pyridium (phenazopyridine), please be aware that if you wear contact lens that this medication may stain your contacts.  While taking this medication it is recommended that you do not wear your contacts until 24 hours after your last dose.  Please follow up with your primary care doctor or specialist as needed.    If you  smoke, please stop smoking.  Urinary Tract Infections in Women    Urinary tract infections (UTIs) are most often caused by bacteria (germs). These bacteria enter the urinary tract. The bacteria may come from outside the body. Or they may travel from the skin outside the rectum or vagina into the urethra. Female anatomy makes it easy for bacteria from the bowel to enter a womans urinary tract, which is the most common source of UTI. This means women develop UTIs more often than men. Pain in or around the urinary tract is a common UTI  symptom. But the only way to know for sure if you have a UTI for the healthcare provider to test your urine. The two tests that may be done are the urinalysis and urine culture.  Types of UTIs  · Cystitis: A bladder infection (cystitis) is the most common UTI in women. You may have urgent or frequent urination. You may also have pain, burning when you urinate, and bloody urine.  · Urethritis: This is an inflamed urethra, which is the tube that carries urine from the bladder to outside the body. You may have lower stomach or back pain. You may also have urgent or frequent urination.  · Pyelonephritis: This is a kidney infection. If not treated, it can be serious and damage your kidneys. In severe cases, you may be hospitalized. You may have a fever and lower back pain.  Medicines to treat a UTI  Most UTIs are treated with antibiotics. These kill the bacteria. The length of time you need to take them depends on the type of infection. It may be as short as 3 days. If you have repeated UTIs, a low-dose antibiotic may be needed for several months. Take antibiotics exactly as directed. Dont stop taking them until all of the medicine is gone. If you stop taking the antibiotic too soon, the infection may not go away, and you may develop a resistance to the antibiotic. This can make it much harder to treat.  Lifestyle changes to treat and prevent UTIs  The lifestyle changes below will help get rid of your UTI. They may also help prevent future UTIs.  · Drink plenty of fluids. This includes water, juice, or other caffeine-free drinks. Fluids help flush bacteria out of your body.  · Empty your bladder. Always empty your bladder when you feel the urge to urinate. And always urinate before going to sleep. Urine that stays in your bladder can lead to infection. Try to urinate before and after sex as well.  · Practice good personal hygiene. Wipe yourself from front to back after using the toilet. This helps keep bacteria from  getting into the urethra.  · Use condoms during sex. These help prevent UTIs caused by sexually transmitted bacteria. Also, avoid using spermicides during sex. These can increase the risk of UTIs. Choose other forms of birth control instead. For women who tend to get UTIs after sex, a low-dose of a preventive antibiotic may be used. Be sure to discuss this option with your healthcare provider.  · Follow up with your healthcare provider as directed. He or she may test to make sure the infection has cleared. If needed, more treatment may be started.  Date Last Reviewed: 1/1/2017  © 2185-8531 [employer]. [address] [place] [address], [place] [address]. All rights reserved. This information is not intended as a substitute for professional medical care. Always follow your healthcare professional's instructions.

## 2017-11-22 DIAGNOSIS — E11.9 TYPE 2 DIABETES MELLITUS WITHOUT COMPLICATION: ICD-10-CM

## 2017-11-23 LAB
BACTERIA UR CULT: ABNORMAL
BACTERIA UR CULT: ABNORMAL
OTHER ANTIBIOTIC SUSC ISLT: ABNORMAL

## 2017-11-24 ENCOUNTER — TELEPHONE (OUTPATIENT)
Dept: URGENT CARE | Facility: CLINIC | Age: 32
End: 2017-11-24

## 2017-11-24 NOTE — TELEPHONE ENCOUNTER
pt given lab/culture results. Pt states she is feeling much better. pt instructed to finish antibiotic.

## 2017-11-24 NOTE — TELEPHONE ENCOUNTER
----- Message from Chadwick Martínez MD sent at 11/23/2017  2:08 PM CST -----  Please call the patient regarding her abnormal result. The antibiotics she has been prescribed should resolve the infection.

## 2017-11-26 ENCOUNTER — TELEPHONE (OUTPATIENT)
Dept: URGENT CARE | Facility: CLINIC | Age: 32
End: 2017-11-26

## 2017-11-30 ENCOUNTER — TELEPHONE (OUTPATIENT)
Dept: URGENT CARE | Facility: CLINIC | Age: 32
End: 2017-11-30

## 2018-04-04 ENCOUNTER — TELEPHONE (OUTPATIENT)
Dept: INTERNAL MEDICINE | Facility: CLINIC | Age: 33
End: 2018-04-04

## 2018-04-04 DIAGNOSIS — Z00.00 ANNUAL PHYSICAL EXAM: Primary | ICD-10-CM

## 2018-04-04 NOTE — TELEPHONE ENCOUNTER
----- Message from Thi Cotter sent at 4/4/2018 10:02 AM CDT -----  Doctor appointment and lab have been scheduled.  Please link lab orders to the lab appointment.  Date of doctor appointment: 4/16   Physical or EP:  physical  Date of lab appointment:  4/9  Comments:

## 2018-04-09 ENCOUNTER — LAB VISIT (OUTPATIENT)
Dept: LAB | Facility: HOSPITAL | Age: 33
End: 2018-04-09
Attending: INTERNAL MEDICINE
Payer: COMMERCIAL

## 2018-04-09 DIAGNOSIS — Z00.00 ANNUAL PHYSICAL EXAM: ICD-10-CM

## 2018-04-09 LAB
ALBUMIN SERPL BCP-MCNC: 3.8 G/DL
ALP SERPL-CCNC: 77 U/L
ALT SERPL W/O P-5'-P-CCNC: 21 U/L
ANION GAP SERPL CALC-SCNC: 9 MMOL/L
AST SERPL-CCNC: 17 U/L
BASOPHILS # BLD AUTO: 0.01 K/UL
BASOPHILS NFR BLD: 0.1 %
BILIRUB SERPL-MCNC: 0.5 MG/DL
BUN SERPL-MCNC: 9 MG/DL
CALCIUM SERPL-MCNC: 9.5 MG/DL
CHLORIDE SERPL-SCNC: 104 MMOL/L
CHOLEST SERPL-MCNC: 239 MG/DL
CHOLEST/HDLC SERPL: 4.6 {RATIO}
CO2 SERPL-SCNC: 25 MMOL/L
CREAT SERPL-MCNC: 0.8 MG/DL
DIFFERENTIAL METHOD: NORMAL
EOSINOPHIL # BLD AUTO: 0 K/UL
EOSINOPHIL NFR BLD: 0 %
ERYTHROCYTE [DISTWIDTH] IN BLOOD BY AUTOMATED COUNT: 12.6 %
EST. GFR  (AFRICAN AMERICAN): >60 ML/MIN/1.73 M^2
EST. GFR  (NON AFRICAN AMERICAN): >60 ML/MIN/1.73 M^2
ESTIMATED AVG GLUCOSE: 97 MG/DL
GLUCOSE SERPL-MCNC: 100 MG/DL
HBA1C MFR BLD HPLC: 5 %
HCT VFR BLD AUTO: 44.8 %
HDLC SERPL-MCNC: 52 MG/DL
HDLC SERPL: 21.8 %
HGB BLD-MCNC: 15.1 G/DL
IMM GRANULOCYTES # BLD AUTO: 0.01 K/UL
IMM GRANULOCYTES NFR BLD AUTO: 0.1 %
LDLC SERPL CALC-MCNC: 161.6 MG/DL
LYMPHOCYTES # BLD AUTO: 2.8 K/UL
LYMPHOCYTES NFR BLD: 35.3 %
MCH RBC QN AUTO: 29 PG
MCHC RBC AUTO-ENTMCNC: 33.7 G/DL
MCV RBC AUTO: 86 FL
MONOCYTES # BLD AUTO: 0.5 K/UL
MONOCYTES NFR BLD: 6.6 %
NEUTROPHILS # BLD AUTO: 4.5 K/UL
NEUTROPHILS NFR BLD: 57.9 %
NONHDLC SERPL-MCNC: 187 MG/DL
NRBC BLD-RTO: 0 /100 WBC
PLATELET # BLD AUTO: 296 K/UL
PMV BLD AUTO: 9.3 FL
POTASSIUM SERPL-SCNC: 3.8 MMOL/L
PROT SERPL-MCNC: 7.2 G/DL
RBC # BLD AUTO: 5.21 M/UL
SODIUM SERPL-SCNC: 138 MMOL/L
TRIGL SERPL-MCNC: 127 MG/DL
TSH SERPL DL<=0.005 MIU/L-ACNC: 1.37 UIU/ML
WBC # BLD AUTO: 7.82 K/UL

## 2018-04-09 PROCEDURE — 83036 HEMOGLOBIN GLYCOSYLATED A1C: CPT

## 2018-04-09 PROCEDURE — 80061 LIPID PANEL: CPT

## 2018-04-09 PROCEDURE — 80053 COMPREHEN METABOLIC PANEL: CPT

## 2018-04-09 PROCEDURE — 85025 COMPLETE CBC W/AUTO DIFF WBC: CPT

## 2018-04-09 PROCEDURE — 36415 COLL VENOUS BLD VENIPUNCTURE: CPT | Mod: PO

## 2018-04-09 PROCEDURE — 84443 ASSAY THYROID STIM HORMONE: CPT

## 2018-04-16 ENCOUNTER — OFFICE VISIT (OUTPATIENT)
Dept: INTERNAL MEDICINE | Facility: CLINIC | Age: 33
End: 2018-04-16
Payer: COMMERCIAL

## 2018-04-16 VITALS
TEMPERATURE: 99 F | WEIGHT: 252.63 LBS | BODY MASS INDEX: 39.65 KG/M2 | HEIGHT: 67 IN | RESPIRATION RATE: 16 BRPM | HEART RATE: 109 BPM | DIASTOLIC BLOOD PRESSURE: 95 MMHG | SYSTOLIC BLOOD PRESSURE: 142 MMHG

## 2018-04-16 DIAGNOSIS — R55 VASOVAGAL SYNCOPE: ICD-10-CM

## 2018-04-16 DIAGNOSIS — E78.5 HYPERLIPIDEMIA, UNSPECIFIED HYPERLIPIDEMIA TYPE: Primary | Chronic | ICD-10-CM

## 2018-04-16 DIAGNOSIS — O24.415 GESTATIONAL DIABETES MELLITUS (GDM) IN SECOND TRIMESTER CONTROLLED ON ORAL HYPOGLYCEMIC DRUG: ICD-10-CM

## 2018-04-16 DIAGNOSIS — I10 ESSENTIAL HYPERTENSION: ICD-10-CM

## 2018-04-16 PROCEDURE — 99214 OFFICE O/P EST MOD 30 MIN: CPT | Mod: S$GLB,,, | Performed by: INTERNAL MEDICINE

## 2018-04-16 PROCEDURE — 99999 PR PBB SHADOW E&M-EST. PATIENT-LVL III: CPT | Mod: PBBFAC,,, | Performed by: INTERNAL MEDICINE

## 2018-04-16 RX ORDER — NIFEDIPINE 90 MG/1
90 TABLET, EXTENDED RELEASE ORAL DAILY
Qty: 30 TABLET | Refills: 4 | Status: SHIPPED | OUTPATIENT
Start: 2018-04-16 | End: 2018-04-17 | Stop reason: SDUPTHER

## 2018-04-16 RX ORDER — ARIPIPRAZOLE 5 MG/1
5 TABLET ORAL EVERY MORNING
Refills: 3 | COMMUNITY
Start: 2018-03-16

## 2018-04-16 NOTE — PROGRESS NOTES
Subjective:       Patient ID: Ana Johnson is a 32 y.o. female.    Chief Complaint: Follow-up    HPI     33 yo female here for follow-up of chronic medical conditions.  She does Angel once a week and walks 2-3 times a week.      HLD - Patient is currently on no medication.  She last lipid panel was   Cholesterol   Date Value Ref Range Status   04/09/2018 239 (H) 120 - 199 mg/dL Final     Comment:     The National Cholesterol Education Program (NCEP) has set the  following guidelines (reference ranges) for Cholesterol:  Optimal.....................<200 mg/dL  Borderline High.............200-239 mg/dL  High........................> or = 240 mg/dL       Triglycerides   Date Value Ref Range Status   04/09/2018 127 30 - 150 mg/dL Final     Comment:     The National Cholesterol Education Program (NCEP) has set the  following guidelines (reference values) for triglycerides:  Normal......................<150 mg/dL  Borderline High.............150-199 mg/dL  High........................200-499 mg/dL       HDL   Date Value Ref Range Status   04/09/2018 52 40 - 75 mg/dL Final     Comment:     The National Cholesterol Education Program (NCEP) has set the  following guidelines (reference values) for HDL Cholesterol:  Low...............<40 mg/dL  Optimal...........>60 mg/dL       LDL Cholesterol   Date Value Ref Range Status   04/09/2018 161.6 (H) 63.0 - 159.0 mg/dL Final     Comment:     The National Cholesterol Education Program (NCEP) has set the  following guidelines (reference values) for LDL Cholesterol:  Optimal.......................<130 mg/dL  Borderline High...............130-159 mg/dL  High..........................160-189 mg/dL  Very High.....................>190 mg/dL     .  Side effects of the medication: none.    HTN - Patient is currently on nifedipine 60 mg. She does check her BP at home, and it runs 120s-130s/80s. Side effects of medications note: none. Denies headaches, blurred vision, chest pain,  shortness of breath, nausea.  She is planning on breast feeding till her child is 1 yo.    She is on Abilify from Dr. Camacho.    She passed out 6 months ago.  She was doing something by the sink and felt really nauseous.  She looked like she was going to tip over.  She could not talk.  She was unconscious for a couple seconds.  She felt hot and flushed.  No jerking her  noted.      Review of Systems   Respiratory: Negative for shortness of breath.    Cardiovascular: Negative for chest pain and palpitations.   Neurological: Negative for headaches.       Objective:      Physical Exam   Constitutional: She is oriented to person, place, and time. She appears well-developed and well-nourished.   HENT:   Head: Normocephalic and atraumatic.   Mouth/Throat: No oropharyngeal exudate.   Eyes: EOM are normal. Pupils are equal, round, and reactive to light. Right eye exhibits no discharge. Left eye exhibits no discharge. No scleral icterus.   Neck: Normal range of motion. Neck supple. No tracheal deviation present. No thyromegaly present.   Cardiovascular: Normal rate, regular rhythm and normal heart sounds.  Exam reveals no gallop and no friction rub.    No murmur heard.  Pulmonary/Chest: Effort normal and breath sounds normal. No respiratory distress. She has no wheezes. She has no rales. She exhibits no tenderness.   Abdominal: Soft. Bowel sounds are normal. She exhibits no distension and no mass. There is no tenderness. There is no rebound and no guarding.   Musculoskeletal: Normal range of motion. She exhibits no edema or tenderness.   Neurological: She is alert and oriented to person, place, and time.   Skin: Skin is warm and dry. No rash noted. No erythema. No pallor.   Psychiatric: She has a normal mood and affect. Her behavior is normal.   Vitals reviewed.      Assessment:       1. Hyperlipidemia, unspecified hyperlipidemia type    2. Essential hypertension    3. BMI 40.0-44.9, adult    4. Gestational diabetes  mellitus (GDM) in second trimester controlled on oral hypoglycemic drug    5. Vasovagal syncope        Plan:       1.  Monitor.  Discussed exercise.  2.  Increase nifedipine to 90 mg daily.  Return to clinic in one month to reassess.  3.  Discussed exercise.  4.  Discussed the patient has increased risk of diabetes from this.  5.  Counseled patient that this is nothing serious before meals worked up.  Unlikely to recur.

## 2018-04-17 RX ORDER — NIFEDIPINE 90 MG/1
90 TABLET, EXTENDED RELEASE ORAL DAILY
Qty: 30 TABLET | Refills: 11 | Status: SHIPPED | OUTPATIENT
Start: 2018-04-17 | End: 2018-07-06

## 2018-04-17 NOTE — TELEPHONE ENCOUNTER
----- Message from Neela Genao sent at 4/17/2018 11:53 AM CDT -----  Contact: Margarita with [facility]  in [employer]  [phone]   Pharmacy is calling to clarify an RX.  RX name: Nifedipine 90 mg   What do they need to clarify:  Need to verify that you faxed this over  Comments:

## 2018-05-18 ENCOUNTER — PATIENT MESSAGE (OUTPATIENT)
Dept: OBSTETRICS AND GYNECOLOGY | Facility: CLINIC | Age: 33
End: 2018-05-18

## 2018-05-21 ENCOUNTER — OFFICE VISIT (OUTPATIENT)
Dept: OBSTETRICS AND GYNECOLOGY | Facility: CLINIC | Age: 33
End: 2018-05-21
Payer: COMMERCIAL

## 2018-05-21 VITALS — BODY MASS INDEX: 39.93 KG/M2 | TEMPERATURE: 102 F | WEIGHT: 254.44 LBS | HEIGHT: 67 IN

## 2018-05-21 DIAGNOSIS — R50.9 FEVER, UNSPECIFIED FEVER CAUSE: ICD-10-CM

## 2018-05-21 PROCEDURE — 99213 OFFICE O/P EST LOW 20 MIN: CPT | Mod: SA,S$GLB,, | Performed by: NURSE PRACTITIONER

## 2018-05-21 PROCEDURE — 99999 PR PBB SHADOW E&M-EST. PATIENT-LVL III: CPT | Mod: PBBFAC,,, | Performed by: NURSE PRACTITIONER

## 2018-05-21 PROCEDURE — 3008F BODY MASS INDEX DOCD: CPT | Mod: CPTII,S$GLB,, | Performed by: NURSE PRACTITIONER

## 2018-05-21 RX ORDER — DICLOXACILLIN SODIUM 250 MG/1
250 CAPSULE ORAL 4 TIMES DAILY
Qty: 40 CAPSULE | Refills: 0 | Status: SHIPPED | OUTPATIENT
Start: 2018-05-21 | End: 2018-05-31

## 2018-05-21 RX ORDER — NIFEDIPINE 90 MG/1
TABLET, FILM COATED, EXTENDED RELEASE ORAL
Refills: 11 | COMMUNITY
Start: 2018-04-17 | End: 2018-05-21 | Stop reason: SDUPTHER

## 2018-05-21 NOTE — PROGRESS NOTES
"Chief Complaint: Breast Pain, bilateral; Nursing infant     (Dr. Woodard patient)    Last Pap: 2017  Normal,  HPV negative    Last Mammogram/Breast Imaging: n/a    HPI:      Ana Johnson is a 32 y.o.  who presents complaining of bilateral breast pain x 3 weeks.  Her son is 16 months old, and she has been nursing him 3-4 x/day.  Approx 3 weeks ago, he suddenly decided to refuse breastfeeding, so pt continued to pump.  She felt a small lump in her right breast, and thought maybe it was a clogged duct, so did massage it.  Pain started in right breast and is now in both breasts; states it is very painful when her son latches and comes off of breast.  She denies having had fever, but has not checked it.  No longer feels any palpable lump in right breast, but both hurt.  Her son did start breastfeeding about a week ago back to 3-4 x/day.  Patient does not have regular monthly menses. No LMP recorded.       ROS:     GENERAL: Denies unintentional weight gain or weight loss. Feeling well overall.   ABDOMEN: Denies abdominal pain, constipation, diarrhea, nausea, vomiting, change in appetite.   URINARY: Denies frequency, dysuria, hematuria.  BREAST: See HPI  GYN: Denies abnormal bleeding or discharge.    Physical Exam:      PHYSICAL EXAM:  Temp (!) 101.8 °F (38.8 °C) (Oral)   Ht 5' 7" (1.702 m)   Wt 115.4 kg (254 lb 6.6 oz)   Breastfeeding? Yes   BMI 39.85 kg/m²   Body mass index is 39.85 kg/m².      APPEARANCE: Well nourished, well developed, in no acute distress. FEBRILE  BREASTS: Bilateral breasts mildly warm to touch, and slightly erythematous in general; no splotchy red patches/wedges noted; no abnormal d/c noted bilaterally, no skin breakdown to nipple noted; no lumps/masses palpated bilaterally.  No changes in skin color/texture noted.  ABDOMEN: Soft.  No tenderness or masses.    EXTREMITIES: No edema.     Assessment/Plan:   Mastitis during puerperium  -     dicloxacillin (DYNAPEN) 250 MG capsule; " Take 1 capsule (250 mg total) by mouth 4 (four) times daily.  Dispense: 40 capsule; Refill: 0    Fever, unspecified fever cause      Follow-up if symptoms worsen or fail to improve.

## 2018-05-21 NOTE — TELEPHONE ENCOUNTER
THis was a message from Friday that I am just now seeing. Want to call or message her and see what is going on. Can offer appt with vianney for breast exam

## 2018-05-23 DIAGNOSIS — N64.4 BREAST PAIN: Primary | ICD-10-CM

## 2018-05-24 ENCOUNTER — PATIENT MESSAGE (OUTPATIENT)
Dept: OBSTETRICS AND GYNECOLOGY | Facility: CLINIC | Age: 33
End: 2018-05-24

## 2018-05-24 ENCOUNTER — HOSPITAL ENCOUNTER (OUTPATIENT)
Dept: RADIOLOGY | Facility: OTHER | Age: 33
Discharge: HOME | End: 2018-05-24
Attending: OBSTETRICS & GYNECOLOGY
Payer: COMMERCIAL

## 2018-05-24 ENCOUNTER — TELEPHONE (OUTPATIENT)
Dept: OBSTETRICS AND GYNECOLOGY | Facility: CLINIC | Age: 33
End: 2018-05-24

## 2018-05-24 DIAGNOSIS — N64.4 BREAST PAIN: ICD-10-CM

## 2018-05-24 PROCEDURE — 76642 ULTRASOUND BREAST LIMITED: CPT | Mod: TC,50

## 2018-05-24 PROCEDURE — 76642 ULTRASOUND BREAST LIMITED: CPT | Mod: 26,RT,, | Performed by: RADIOLOGY

## 2018-05-24 PROCEDURE — 76642 ULTRASOUND BREAST LIMITED: CPT | Mod: 26,LT,, | Performed by: RADIOLOGY

## 2018-05-24 NOTE — TELEPHONE ENCOUNTER
----- Message from Erica Woodard MD sent at 5/24/2018  4:25 PM CDT -----  Call pt/ breast u/s normal. Continue meds. If not better by Monday call.

## 2018-05-24 NOTE — TELEPHONE ENCOUNTER
Notified pt that breast u/s per Dr. Woodard is normal. Pt is to continue meds at this time and will return call Monday if she is not better.

## 2018-05-28 ENCOUNTER — LAB VISIT (OUTPATIENT)
Dept: LAB | Facility: HOSPITAL | Age: 33
End: 2018-05-28
Attending: OBSTETRICS & GYNECOLOGY
Payer: COMMERCIAL

## 2018-05-28 ENCOUNTER — OFFICE VISIT (OUTPATIENT)
Dept: OBSTETRICS AND GYNECOLOGY | Facility: CLINIC | Age: 33
End: 2018-05-28
Payer: COMMERCIAL

## 2018-05-28 ENCOUNTER — TELEPHONE (OUTPATIENT)
Dept: OBSTETRICS AND GYNECOLOGY | Facility: CLINIC | Age: 33
End: 2018-05-28

## 2018-05-28 ENCOUNTER — OFFICE VISIT (OUTPATIENT)
Dept: URGENT CARE | Facility: CLINIC | Age: 33
End: 2018-05-28
Payer: COMMERCIAL

## 2018-05-28 VITALS
BODY MASS INDEX: 40.49 KG/M2 | HEIGHT: 67 IN | RESPIRATION RATE: 19 BRPM | TEMPERATURE: 101 F | OXYGEN SATURATION: 97 % | HEART RATE: 116 BPM | SYSTOLIC BLOOD PRESSURE: 140 MMHG | DIASTOLIC BLOOD PRESSURE: 90 MMHG | WEIGHT: 258 LBS

## 2018-05-28 VITALS
WEIGHT: 258.06 LBS | SYSTOLIC BLOOD PRESSURE: 126 MMHG | DIASTOLIC BLOOD PRESSURE: 70 MMHG | BODY MASS INDEX: 40.5 KG/M2 | HEIGHT: 67 IN

## 2018-05-28 DIAGNOSIS — J02.9 SORE THROAT: ICD-10-CM

## 2018-05-28 DIAGNOSIS — R50.81 FEVER IN OTHER DISEASES: ICD-10-CM

## 2018-05-28 DIAGNOSIS — B37.89 YEAST INFECTION OF NIPPLE, POSTPARTUM: Primary | ICD-10-CM

## 2018-05-28 DIAGNOSIS — N61.0 BREAST INFECTION: Primary | ICD-10-CM

## 2018-05-28 LAB
ALBUMIN SERPL BCP-MCNC: 3.2 G/DL
ALP SERPL-CCNC: 87 U/L
ALT SERPL W/O P-5'-P-CCNC: 58 U/L
ANION GAP SERPL CALC-SCNC: 8 MMOL/L
ANISOCYTOSIS BLD QL SMEAR: SLIGHT
AST SERPL-CCNC: 47 U/L
BASOPHILS NFR BLD: 0 %
BILIRUB SERPL-MCNC: 0.5 MG/DL
BUN SERPL-MCNC: 8 MG/DL
CALCIUM SERPL-MCNC: 9.2 MG/DL
CHLORIDE SERPL-SCNC: 103 MMOL/L
CO2 SERPL-SCNC: 26 MMOL/L
CREAT SERPL-MCNC: 0.8 MG/DL
CTP QC/QA: YES
DIFFERENTIAL METHOD: ABNORMAL
EOSINOPHIL NFR BLD: 0 %
ERYTHROCYTE [DISTWIDTH] IN BLOOD BY AUTOMATED COUNT: 13.4 %
EST. GFR  (AFRICAN AMERICAN): >60 ML/MIN/1.73 M^2
EST. GFR  (NON AFRICAN AMERICAN): >60 ML/MIN/1.73 M^2
GLUCOSE SERPL-MCNC: 95 MG/DL
HCT VFR BLD AUTO: 39.4 %
HGB BLD-MCNC: 13.2 G/DL
IMM GRANULOCYTES # BLD AUTO: ABNORMAL K/UL
IMM GRANULOCYTES NFR BLD AUTO: ABNORMAL %
LYMPHOCYTES NFR BLD: 47 %
MCH RBC QN AUTO: 28.8 PG
MCHC RBC AUTO-ENTMCNC: 33.5 G/DL
MCV RBC AUTO: 86 FL
METAMYELOCYTES NFR BLD MANUAL: 1 %
MONOCYTES NFR BLD: 13 %
NEUTROPHILS NFR BLD: 37 %
NEUTS BAND NFR BLD MANUAL: 2 %
NRBC BLD-RTO: 0 /100 WBC
OVALOCYTES BLD QL SMEAR: ABNORMAL
PLATELET # BLD AUTO: 243 K/UL
PLATELET BLD QL SMEAR: ABNORMAL
PMV BLD AUTO: 10.1 FL
POIKILOCYTOSIS BLD QL SMEAR: SLIGHT
POLYCHROMASIA BLD QL SMEAR: ABNORMAL
POTASSIUM SERPL-SCNC: 3.8 MMOL/L
PROT SERPL-MCNC: 6.9 G/DL
RBC # BLD AUTO: 4.59 M/UL
S PYO RRNA THROAT QL PROBE: NEGATIVE
SODIUM SERPL-SCNC: 137 MMOL/L
WBC # BLD AUTO: 12.69 K/UL

## 2018-05-28 PROCEDURE — 99213 OFFICE O/P EST LOW 20 MIN: CPT | Mod: S$GLB,,, | Performed by: OBSTETRICS & GYNECOLOGY

## 2018-05-28 PROCEDURE — 87880 STREP A ASSAY W/OPTIC: CPT | Mod: QW,S$GLB,, | Performed by: NURSE PRACTITIONER

## 2018-05-28 PROCEDURE — 3008F BODY MASS INDEX DOCD: CPT | Mod: CPTII,S$GLB,, | Performed by: OBSTETRICS & GYNECOLOGY

## 2018-05-28 PROCEDURE — 99999 PR PBB SHADOW E&M-EST. PATIENT-LVL II: CPT | Mod: PBBFAC,,, | Performed by: OBSTETRICS & GYNECOLOGY

## 2018-05-28 PROCEDURE — 80053 COMPREHEN METABOLIC PANEL: CPT

## 2018-05-28 PROCEDURE — 85060 BLOOD SMEAR INTERPRETATION: CPT | Mod: ,,, | Performed by: PATHOLOGY

## 2018-05-28 PROCEDURE — 85027 COMPLETE CBC AUTOMATED: CPT

## 2018-05-28 PROCEDURE — 85007 BL SMEAR W/DIFF WBC COUNT: CPT

## 2018-05-28 PROCEDURE — 99214 OFFICE O/P EST MOD 30 MIN: CPT | Mod: SA,S$GLB,, | Performed by: NURSE PRACTITIONER

## 2018-05-28 RX ORDER — AMOXICILLIN AND CLAVULANATE POTASSIUM 875; 125 MG/1; MG/1
1 TABLET, FILM COATED ORAL 2 TIMES DAILY
Qty: 20 TABLET | Refills: 0 | Status: SHIPPED | OUTPATIENT
Start: 2018-05-28 | End: 2018-05-30

## 2018-05-28 RX ORDER — FLUCONAZOLE 100 MG/1
100 TABLET ORAL DAILY
Qty: 10 TABLET | Refills: 0 | Status: SHIPPED | OUTPATIENT
Start: 2018-05-28 | End: 2018-06-07

## 2018-05-28 NOTE — PATIENT INSTRUCTIONS
Pharyngitis (Sore Throat), Report Pending    Pharyngitis (sore throat) is often due to a virus. It can also be caused by the streptococcus, or strep, bacterium, often called strep throat. Both viral and strep infections can cause throat pain that is worse when swallowing, aching all over with headache, and fever. Both types of infections are contagious. They may be spread by coughing, kissing, or touching others after touching your mouth or nose.  A test has been done to find out whether you (or your child, if your child is the patient) have strep throat. Call this facility or your healthcare provider if you were not given your test results. If the test is positive for strep infection, you will need to take antibiotic medicines. A prescription can be called into your pharmacy at that time. If the test is negative, you probably have a viral pharyngitis. This does not need to be treated with antibiotics. Until you receive the results of the strep test, you should stay home from work. If your child is being tested, he or she should stay home from school.  Home care  · Rest at home. Drink plenty of fluids so you won't get dehydrated.  · If the test is positive for strep, don't go to work or school for the first 2 days of taking the antibiotics. After this time, you will not be contagious. You can then return to work or school if you are feeling better.   · Take the antibiotic medicine for the full 10 days, even if you feel better. This is very important to make sure the infection is treated. It is also important to prevent drug-resistant germs from developing. If you were given an antibiotic shot, you won't need more antibiotics.  · For children: Use acetaminophen for fever, fussiness, or discomfort. In infants older than 6 months of age, you may use ibuprofen instead of acetaminophen. Talk with your child's healthcare provider before giving these medicines if your child has chronic liver or kidney disease or ever had  a stomach ulcer or GI bleeding. Never give aspirin to a child under 18 years of age who is ill with a fever. It may cause severe liver damage.  · For adults: Use acetaminophen or ibuprofen to control pain or fever, unless another medicine was prescribed for this. Talk with your healthcare provider before taking these medicines if you have chronic liver or kidney disease or ever had a stomach ulcer or GI bleeding.  · Use throat lozenges or numbing throat sprays to help reduce pain. Gargling with warm salt water will also help reduce throat pain. For this, dissolve 1/2 teaspoon of salt in 1 glass of warm water. To help soothe a sore throat, children can sip on juice or a popsicle. Children 5 years and older can also suck on a lollipop or hard candy.  · Don't eat salty or spicy foods. These can irritate the throat.  Follow-up care  Follow up with your healthcare provider or our staff if you don't get better over the next week.  When to seek medical advice  Call your healthcare provider right away if any of these occur:  · Fever as directed by your healthcare provider. For children, seek care if:  ¨ Your child is of any age and has repeated fevers above 104°F (40°C).  ¨ Your child is younger than 2 years of age and has a fever of 100.4°F (38°C) that continues for more than 1 day.  ¨ Your child is 2 years old or older and has a fever of 100.4°F (38°C) that continues for more than 3 days.  · New or worsening ear pain, sinus pain, or headache  · Painful lumps in the back of neck  · Stiff neck  · Lymph nodes are getting larger  · Inability to swallow liquids, excessive drooling, or inability to open mouth wide due to throat pain  · Signs of dehydration (very dark urine or no urine, sunken eyes, dizziness)  · Trouble breathing or noisy breathing  · Muffled voice  · New rash  · Child appears to be getting sicker  Date Last Reviewed: 4/13/2015  © 8328-3655 [employer]. [address] [place] [address], [place] [address].  All rights reserved. This information is not intended as a substitute for professional medical care. Always follow your healthcare professional's instructions.        Mastitis  Mastitis occurs when breast tissue becomes swollen and inflamed. This is almost always due to infection. Mastitis most often affects breastfeeding women during the first 6 weeks after childbirth. For this reason, its also known as lactation mastitis. Infection may happen after a duct becomes clogged, causing milk to back up in the breast. Mastitis may also occur if bacteria enter the breast through small cracks in the nipple. (Less often, mastitis occurs in women who arent breastfeeding. If you have mastitis that is not due to breastfeeding, your healthcare provider will give you more information as needed. Treatment may include some of the same home care measures listed below.)  Mastitis may cause flu-like symptoms such as fever, aches, and fatigue. The affected breast may feel painful, warm, tender, firm, or swollen. The skin over the breast may be red (often in a wedge-shaped pattern). You may feel a burning a sensation when breastfeeding.  In most cases, mastitis can be treated with antibiotics. This should clear the infection. If treatment is delayed, a pocket of pus (abscess) can form in the breast tissue. A procedure may then be needed to drain the pus. In severe cases of infection, other treatments may be needed.  Home care  Breastfeeding  · Its very important to keep the milk flowing from the infected breast. Continue breastfeeding from both breasts as usual. This will not hurt the baby. If this is too painful, use a breast pump to remove milk from the infected side. This can be fed to your baby or discarded. Note: If you don't continue to breastfeed or pump your breast, bacteria can grow in the milk that is left in your breast. This can make your infection worse.  · Tell your healthcare provider if you have problems with  breastfeeding. He or she may suggest changes to your technique, if needed. You may also be referred to a lactation nurse or consultant for support with breastfeeding.  General care  · Take any medicines youre prescribed as directed. If youre taking antibiotics, be sure to complete all of the medicine even if you start to feel better. Over-the-counter pain medicines may also be recommended. Dont use breast creams or other products or medicines without talking to your healthcare provider first. Note: If youre concerned about taking medicines while breastfeeding, talk to your healthcare provider.  · Rest as often as needed. Also be sure to drink plenty of fluids.  · To help relieve pain and swelling, heat or ice may be used. Apply as often as directed by your provider.  ¨ Heat: Place a warm compress on the breast. Use a towel soaked in hot water, a heating pad, or a hot water bottle.  ¨ Cold: Place a cold compress on the breast. Use an ice pack or bag of ice wrapped in a thin towel. Never place a cold source directly on the skin.  Follow-up care  Follow up with your healthcare provider as advised.  When to seek medical advice  Call your healthcare provider right away if any of these occur:  · Fever of 100.4°F (38°C) or higher, or as directed by your provider  · Shaking chills  · Symptoms worsen or do not improve within 48 to 72 hours of starting treatment  · New symptoms develop  Date Last Reviewed: 7/30/2015  © 8615-1436 [employer]. [address] [place] [address], [place] [address]. All rights reserved. This information is not intended as a substitute for professional medical care. Always follow your healthcare professional's instructions.

## 2018-05-28 NOTE — PROGRESS NOTES
Subjective:       Patient ID: Ana Johnson is a 32 y.o. female.    Chief Complaint:  Breast Pain (both breast in pain - pt still pumping and barely getting any milk - pt could be drying up - nipples are dry and scaly - pt has been getting fever - baby sick with strep throat right now)      Patient Active Problem List   Diagnosis    HLD (hyperlipidemia)    Irregular menses    IBS (irritable bowel syndrome)    Essential hypertension    BMI 40.0-44.9, adult    Metatarsalgia    Depression    Arrested active phase of labor    Contact dermatitis due to adhesive bandage    Gestational diabetes       History of Present Illness  Here for evaluation of persistent fever and bilateral sharp shooting breast pain worse when nursing. Baby has recently weaned himself. Patient is now pumping twice a day and only getting about 1 ounce total when she pumps. Does not feel engouraged. She was started on Dicloxacillin for presumed mastitis. She had fever and breast pain, also had no abscess on breast ultrasound. Baby was recently diagnosed with strep throat and was started on amoxicillin and is now being started on another antibiotic because he is not responding. Patient says she still has fever but it is lower than it was before.       Past Medical History:   Diagnosis Date    Depression     stable Venlafaxine 300 mg qd, Celexa & Zoloft & Buspar & Wellbutrin did not help, Abilify did help as an additive    Gestational diabetes     Gestational diabetes     History of anorexia nervosa     high school    History of eating disorder     binge    HLD (hyperlipidemia) 2014    HTN (hypertension)     IBS (irritable bowel syndrome)     Irregular menses 2014    Obesity     Urticaria, cholinergic     worse with stress, Zyrtec prn       Past Surgical History:   Procedure Laterality Date     SECTION         OB History    Para Term  AB Living   1 1 1 0 0 1   SAB TAB Ectopic Multiple Live  Births   0 0 0 0 1      # Outcome Date GA Lbr Tommy/2nd Weight Sex Delivery Anes PTL Lv   1 Term 12/29/16 39w2d  3.95 kg (8 lb 11.3 oz) M CS-LTranv EPI N VENANCIO      Complications: Failure to Progress in First Stage          Patient's last menstrual period was 05/16/2018.   Date of Last Pap: 6/5/2017    Review of Systems  Review of Systems   Constitutional: Positive for fever. Negative for fatigue and unexpected weight change.   Respiratory: Negative for shortness of breath.    Cardiovascular: Negative for chest pain.   Gastrointestinal: Negative for abdominal pain, constipation, diarrhea, nausea and vomiting.   Genitourinary: Negative for dysuria.   Musculoskeletal: Negative for back pain.   Skin: Negative for rash.   Neurological: Negative for headaches.   Hematological: Does not bruise/bleed easily.   Psychiatric/Behavioral: Negative for behavioral problems.        Objective:   Physical Exam:   Constitutional: She appears well-developed and well-nourished.        Pulmonary/Chest:   No palpable masses, no nipple discharge, no erythema. Normal breast exam                             Assessment/ Plan:     1. Yeast infection of nipple, postpartum  fluconazole (DIFLUCAN) 100 MG tablet   2. Fever in other diseases  Comprehensive metabolic panel    CBC auto differential     Due to sharp shooting pain in breasts will try as if yeast infection with Diflucan.  Will check CBC and CMP  Already had u/s and no abscess in breasts  Consider strep swab because baby has strep throat. Although she has no symptoms.  Will call with labs and if WBC elevated, consider looking for other sources of fever other than breast.   She is also currently weaning after feeding for 16 months, so the fever could just be from that.

## 2018-05-28 NOTE — TELEPHONE ENCOUNTER
Notified pt that Dr. Woodard would like her to come in for an evaluation. Pt scheduled for 2:45 today.

## 2018-05-28 NOTE — TELEPHONE ENCOUNTER
That is fine. My 1 pm canceled also, so she can come then if that is better for her. But yes ok for me to see her

## 2018-05-28 NOTE — PROGRESS NOTES
"Subjective:       Patient ID: Ana Johnson is a 32 y.o. female.    Vitals:  height is 5' 7" (1.702 m) and weight is 117 kg (258 lb). Her oral temperature is 100.8 °F (38.2 °C) (abnormal). Her blood pressure is 140/90 (abnormal) and her pulse is 116 (abnormal). Her respiration is 19 and oxygen saturation is 97%.     Chief Complaint: Fever    Pt c/o fever, body aches for over 1 week now. Was started on doxycycline 1 week ago for possible mastitis, but states sill not feeling better. Also c/o burning to both breasts & worse with breastfeeding. Pt states her 16 month son was recently diagnosed with strep      Fever    This is a new problem. Episode onset: 1 week. The problem occurs constantly. The problem has been unchanged. The maximum temperature noted was 101 to 101.9 F. The temperature was taken using an oral thermometer. Pertinent negatives include no abdominal pain, chest pain, congestion, coughing, ear pain, headaches, nausea, sore throat or wheezing. She has tried acetaminophen for the symptoms. The treatment provided mild relief.   Risk factors: sick contacts (son has strep thoart)    Risk factors: no contaminated food, no contaminated water, no hx of cancer, no immunosuppression, no occupational exposure, no recent sickness and no recent travel      Review of Systems   Constitution: Positive for fever. Negative for chills and malaise/fatigue.   HENT: Negative for congestion, ear pain, hoarse voice and sore throat.    Eyes: Negative for discharge and redness.   Cardiovascular: Negative for chest pain, dyspnea on exertion and leg swelling.   Respiratory: Negative for cough, shortness of breath, sputum production and wheezing.    Musculoskeletal: Negative for myalgias.   Gastrointestinal: Negative for abdominal pain and nausea.   Neurological: Negative for headaches.       Objective:      Physical Exam   Constitutional: She is oriented to person, place, and time. She appears well-developed and " well-nourished. She is cooperative.  Non-toxic appearance. She does not appear ill. No distress.   HENT:   Head: Normocephalic and atraumatic.   Right Ear: Hearing, tympanic membrane and ear canal normal.   Left Ear: Hearing, tympanic membrane and ear canal normal.   Nose: No mucosal edema, rhinorrhea or nasal deformity. No epistaxis. Right sinus exhibits no maxillary sinus tenderness and no frontal sinus tenderness. Left sinus exhibits no maxillary sinus tenderness and no frontal sinus tenderness.   Mouth/Throat: Uvula is midline and mucous membranes are normal. No trismus in the jaw. Normal dentition. No uvula swelling. Posterior oropharyngeal edema and posterior oropharyngeal erythema present.   Eyes: Conjunctivae and lids are normal. Right eye exhibits no discharge. Left eye exhibits no discharge. No scleral icterus.   Sclera clear bilat   Neck: Trachea normal, normal range of motion, full passive range of motion without pain and phonation normal. Neck supple.   Cardiovascular: Normal rate, regular rhythm and normal pulses.    Pulmonary/Chest: Effort normal. No respiratory distress.   Abdominal: Normal appearance. She exhibits no distension, no pulsatile midline mass and no mass. There is no tenderness.   Musculoskeletal: Normal range of motion. She exhibits no edema or deformity.   Neurological: She is alert and oriented to person, place, and time. She exhibits normal muscle tone. Coordination normal.   Skin: Skin is warm, dry and intact. She is not diaphoretic. No pallor.   Psychiatric: She has a normal mood and affect. Her speech is normal and behavior is normal. Judgment and thought content normal. Cognition and memory are normal.   Nursing note and vitals reviewed.      Assessment:       1. Breast infection    2. Sore throat        Plan:       Patient Instructions     Pharyngitis (Sore Throat), Report Pending    Pharyngitis (sore throat) is often due to a virus. It can also be caused by the streptococcus,  or strep, bacterium, often called strep throat. Both viral and strep infections can cause throat pain that is worse when swallowing, aching all over with headache, and fever. Both types of infections are contagious. They may be spread by coughing, kissing, or touching others after touching your mouth or nose.  A test has been done to find out whether you (or your child, if your child is the patient) have strep throat. Call this facility or your healthcare provider if you were not given your test results. If the test is positive for strep infection, you will need to take antibiotic medicines. A prescription can be called into your pharmacy at that time. If the test is negative, you probably have a viral pharyngitis. This does not need to be treated with antibiotics. Until you receive the results of the strep test, you should stay home from work. If your child is being tested, he or she should stay home from school.  Home care  · Rest at home. Drink plenty of fluids so you won't get dehydrated.  · If the test is positive for strep, don't go to work or school for the first 2 days of taking the antibiotics. After this time, you will not be contagious. You can then return to work or school if you are feeling better.   · Take the antibiotic medicine for the full 10 days, even if you feel better. This is very important to make sure the infection is treated. It is also important to prevent drug-resistant germs from developing. If you were given an antibiotic shot, you won't need more antibiotics.  · For children: Use acetaminophen for fever, fussiness, or discomfort. In infants older than 6 months of age, you may use ibuprofen instead of acetaminophen. Talk with your child's healthcare provider before giving these medicines if your child has chronic liver or kidney disease or ever had a stomach ulcer or GI bleeding. Never give aspirin to a child under 18 years of age who is ill with a fever. It may cause severe liver  damage.  · For adults: Use acetaminophen or ibuprofen to control pain or fever, unless another medicine was prescribed for this. Talk with your healthcare provider before taking these medicines if you have chronic liver or kidney disease or ever had a stomach ulcer or GI bleeding.  · Use throat lozenges or numbing throat sprays to help reduce pain. Gargling with warm salt water will also help reduce throat pain. For this, dissolve 1/2 teaspoon of salt in 1 glass of warm water. To help soothe a sore throat, children can sip on juice or a popsicle. Children 5 years and older can also suck on a lollipop or hard candy.  · Don't eat salty or spicy foods. These can irritate the throat.  Follow-up care  Follow up with your healthcare provider or our staff if you don't get better over the next week.  When to seek medical advice  Call your healthcare provider right away if any of these occur:  · Fever as directed by your healthcare provider. For children, seek care if:  ¨ Your child is of any age and has repeated fevers above 104°F (40°C).  ¨ Your child is younger than 2 years of age and has a fever of 100.4°F (38°C) that continues for more than 1 day.  ¨ Your child is 2 years old or older and has a fever of 100.4°F (38°C) that continues for more than 3 days.  · New or worsening ear pain, sinus pain, or headache  · Painful lumps in the back of neck  · Stiff neck  · Lymph nodes are getting larger  · Inability to swallow liquids, excessive drooling, or inability to open mouth wide due to throat pain  · Signs of dehydration (very dark urine or no urine, sunken eyes, dizziness)  · Trouble breathing or noisy breathing  · Muffled voice  · New rash  · Child appears to be getting sicker  Date Last Reviewed: 4/13/2015  © 2548-2051 [employer]. [address] [place] [address], [place] [address]. All rights reserved. This information is not intended as a substitute for professional medical care. Always follow your healthcare  professional's instructions.        Mastitis  Mastitis occurs when breast tissue becomes swollen and inflamed. This is almost always due to infection. Mastitis most often affects breastfeeding women during the first 6 weeks after childbirth. For this reason, its also known as lactation mastitis. Infection may happen after a duct becomes clogged, causing milk to back up in the breast. Mastitis may also occur if bacteria enter the breast through small cracks in the nipple. (Less often, mastitis occurs in women who arent breastfeeding. If you have mastitis that is not due to breastfeeding, your healthcare provider will give you more information as needed. Treatment may include some of the same home care measures listed below.)  Mastitis may cause flu-like symptoms such as fever, aches, and fatigue. The affected breast may feel painful, warm, tender, firm, or swollen. The skin over the breast may be red (often in a wedge-shaped pattern). You may feel a burning a sensation when breastfeeding.  In most cases, mastitis can be treated with antibiotics. This should clear the infection. If treatment is delayed, a pocket of pus (abscess) can form in the breast tissue. A procedure may then be needed to drain the pus. In severe cases of infection, other treatments may be needed.  Home care  Breastfeeding  · Its very important to keep the milk flowing from the infected breast. Continue breastfeeding from both breasts as usual. This will not hurt the baby. If this is too painful, use a breast pump to remove milk from the infected side. This can be fed to your baby or discarded. Note: If you don't continue to breastfeed or pump your breast, bacteria can grow in the milk that is left in your breast. This can make your infection worse.  · Tell your healthcare provider if you have problems with breastfeeding. He or she may suggest changes to your technique, if needed. You may also be referred to a lactation nurse or consultant for  support with breastfeeding.  General care  · Take any medicines youre prescribed as directed. If youre taking antibiotics, be sure to complete all of the medicine even if you start to feel better. Over-the-counter pain medicines may also be recommended. Dont use breast creams or other products or medicines without talking to your healthcare provider first. Note: If youre concerned about taking medicines while breastfeeding, talk to your healthcare provider.  · Rest as often as needed. Also be sure to drink plenty of fluids.  · To help relieve pain and swelling, heat or ice may be used. Apply as often as directed by your provider.  ¨ Heat: Place a warm compress on the breast. Use a towel soaked in hot water, a heating pad, or a hot water bottle.  ¨ Cold: Place a cold compress on the breast. Use an ice pack or bag of ice wrapped in a thin towel. Never place a cold source directly on the skin.  Follow-up care  Follow up with your healthcare provider as advised.  When to seek medical advice  Call your healthcare provider right away if any of these occur:  · Fever of 100.4°F (38°C) or higher, or as directed by your provider  · Shaking chills  · Symptoms worsen or do not improve within 48 to 72 hours of starting treatment  · New symptoms develop  Date Last Reviewed: 7/30/2015 © 2000-2017 [employer]. [address] [place] [address], [place] [address]. All rights reserved. This information is not intended as a substitute for professional medical care. Always follow your healthcare professional's instructions.              Breast infection    Sore throat  -     POCT rapid strep A  -     Strep A culture, throat    Other orders  -     amoxicillin-clavulanate 875-125mg (AUGMENTIN) 875-125 mg per tablet; Take 1 tablet by mouth 2 (two) times daily.  Dispense: 20 tablet; Refill: 0

## 2018-05-28 NOTE — TELEPHONE ENCOUNTER
Vance pt- seen in office on 5/21/18 for Mastitis. She is still breastfeeding her 16 month old, and was given dicloxacillin to take 4 x daily. Pt still c/o breast pain so bilat u/s of breast done on 5/23/18. Results were normal.   Pt state both breast are still hurting. Mostly while she feeds, and after. She states it yumi and stings. States the stinging and burning are mostly in the nipple, but radiate back towards the breast. Fevers are better. Highest last night was 100.0. David not feel like Rx is helping because her pain is the same.

## 2018-05-29 LAB — PATH REV BLD -IMP: NORMAL

## 2018-05-30 ENCOUNTER — OFFICE VISIT (OUTPATIENT)
Dept: INTERNAL MEDICINE | Facility: CLINIC | Age: 33
End: 2018-05-30
Payer: COMMERCIAL

## 2018-05-30 VITALS
TEMPERATURE: 99 F | WEIGHT: 256.38 LBS | SYSTOLIC BLOOD PRESSURE: 114 MMHG | DIASTOLIC BLOOD PRESSURE: 70 MMHG | HEART RATE: 135 BPM | RESPIRATION RATE: 16 BRPM | HEIGHT: 67 IN | BODY MASS INDEX: 40.24 KG/M2

## 2018-05-30 DIAGNOSIS — N64.4 BREAST PAIN: ICD-10-CM

## 2018-05-30 DIAGNOSIS — I10 ESSENTIAL HYPERTENSION: Primary | Chronic | ICD-10-CM

## 2018-05-30 LAB — S PYO THROAT QL CULT: POSITIVE

## 2018-05-30 PROCEDURE — 99214 OFFICE O/P EST MOD 30 MIN: CPT | Mod: S$GLB,,, | Performed by: INTERNAL MEDICINE

## 2018-05-30 PROCEDURE — 3008F BODY MASS INDEX DOCD: CPT | Mod: CPTII,S$GLB,, | Performed by: INTERNAL MEDICINE

## 2018-05-30 PROCEDURE — 99999 PR PBB SHADOW E&M-EST. PATIENT-LVL III: CPT | Mod: PBBFAC,,, | Performed by: INTERNAL MEDICINE

## 2018-05-30 NOTE — PROGRESS NOTES
Subjective:       Patient ID: Ana Johnson is a 32 y.o. female.    Chief Complaint: Follow-up    HPI     32-year-old female here for follow-up of HTN - Patient is currently on Procardia XL 90 mg. She does check her BP at home, and it runs 110s/70s. Side effects of medications note: none. Denies headaches, blurred vision, chest pain, shortness of breath, nausea.  Cholesterol   Date Value Ref Range Status   04/09/2018 239 (H) 120 - 199 mg/dL Final     Comment:     The National Cholesterol Education Program (NCEP) has set the  following guidelines (reference ranges) for Cholesterol:  Optimal.....................<200 mg/dL  Borderline High.............200-239 mg/dL  High........................> or = 240 mg/dL       Triglycerides   Date Value Ref Range Status   04/09/2018 127 30 - 150 mg/dL Final     Comment:     The National Cholesterol Education Program (NCEP) has set the  following guidelines (reference values) for triglycerides:  Normal......................<150 mg/dL  Borderline High.............150-199 mg/dL  High........................200-499 mg/dL       HDL   Date Value Ref Range Status   04/09/2018 52 40 - 75 mg/dL Final     Comment:     The National Cholesterol Education Program (NCEP) has set the  following guidelines (reference values) for HDL Cholesterol:  Low...............<40 mg/dL  Optimal...........>60 mg/dL       LDL Cholesterol   Date Value Ref Range Status   04/09/2018 161.6 (H) 63.0 - 159.0 mg/dL Final     Comment:     The National Cholesterol Education Program (NCEP) has set the  following guidelines (reference values) for LDL Cholesterol:  Optimal.......................<130 mg/dL  Borderline High...............130-159 mg/dL  High..........................160-189 mg/dL  Very High.....................>190 mg/dL       She has been sick.  She thought she had mastitis.  She had breast pain and saw her OB.  She had a fever.  She was put on doxycycline.  She did not get better and her son was  diagnosed with strep throat.  OB did breast US that was normal.  Still had fever to 102 Monday.  She had fever for a week.  She was told could be thrush in her breast and something else.  Sent to .  Had strep test done at  that was negative.  Both breast are involved.  She does not have fever any more.  She is on diflucan.  She was changed from doxycycline and was told to ask about changing to augmentin or staying on doxycycline (has been taking this for 10 days).    Review of Systems   Respiratory: Negative for shortness of breath.    Cardiovascular: Negative for chest pain and palpitations.   Musculoskeletal: Negative for neck pain.   Neurological: Negative for headaches.       Objective:      Physical Exam   Constitutional: She is oriented to person, place, and time. She appears well-developed and well-nourished.   HENT:   Head: Normocephalic and atraumatic.   Mouth/Throat: No oropharyngeal exudate.   Eyes: EOM are normal. Pupils are equal, round, and reactive to light. Right eye exhibits no discharge. Left eye exhibits no discharge. No scleral icterus.   Neck: Normal range of motion. Neck supple. No tracheal deviation present. No thyromegaly present.   Cardiovascular: Normal rate, regular rhythm and normal heart sounds.  Exam reveals no gallop and no friction rub.    No murmur heard.  Pulmonary/Chest: Effort normal and breath sounds normal. No respiratory distress. She has no wheezes. She has no rales. She exhibits no tenderness. Right breast exhibits no inverted nipple and no skin change. Left breast exhibits no inverted nipple and no skin change.   Abdominal: Soft. Bowel sounds are normal. She exhibits no distension and no mass. There is no tenderness. There is no rebound and no guarding.   Musculoskeletal: Normal range of motion. She exhibits no edema or tenderness.   Neurological: She is alert and oriented to person, place, and time.   Skin: Skin is warm and dry. No rash noted. No erythema. No pallor.    Psychiatric: She has a normal mood and affect. Her behavior is normal.   Vitals reviewed.      Assessment:       1. Essential hypertension    2. Breast pain        Plan:       1.  Procardia XL 90 mg  2.  No signs of cellulitis bilaterally. Patient encouraged to take Diflucan to completion.  Patient advised not to take the Augmentin, because I think a positive strep test indicates colonization of the mouth rather than an infection of her breast.  If pain worsens with stopping Diflucan, patient instructed to let me know.

## 2018-05-31 ENCOUNTER — TELEPHONE (OUTPATIENT)
Dept: URGENT CARE | Facility: CLINIC | Age: 33
End: 2018-05-31

## 2018-05-31 NOTE — TELEPHONE ENCOUNTER
----- Message from Jeaneth Devine NP sent at 5/30/2018 12:32 PM CDT -----  Please call the patient and let her know her strep culture showed she does have strep pharyngitis. She was started on Augmentin for strep on her visit on 5/28/2018. See how she is doing and if the throat is improving. She needs to continue the antibiotic until completion even if symptoms have resolved.

## 2018-05-31 NOTE — TELEPHONE ENCOUNTER
I left a message to let the patient know we had received the results from her throat culture and to see how she was feeling. I reminded her to complete the antibiotic she was prescribed even if her symptoms were going away.

## 2018-06-08 DIAGNOSIS — E11.9 TYPE 2 DIABETES MELLITUS WITHOUT COMPLICATION: ICD-10-CM

## 2018-07-05 ENCOUNTER — PATIENT MESSAGE (OUTPATIENT)
Dept: OBSTETRICS AND GYNECOLOGY | Facility: CLINIC | Age: 33
End: 2018-07-05

## 2018-07-06 ENCOUNTER — OFFICE VISIT (OUTPATIENT)
Dept: OBSTETRICS AND GYNECOLOGY | Facility: CLINIC | Age: 33
End: 2018-07-06
Payer: COMMERCIAL

## 2018-07-06 ENCOUNTER — TELEPHONE (OUTPATIENT)
Dept: SURGERY | Facility: CLINIC | Age: 33
End: 2018-07-06

## 2018-07-06 VITALS
DIASTOLIC BLOOD PRESSURE: 64 MMHG | WEIGHT: 248.13 LBS | BODY MASS INDEX: 38.94 KG/M2 | HEIGHT: 67 IN | SYSTOLIC BLOOD PRESSURE: 118 MMHG

## 2018-07-06 DIAGNOSIS — N61.0 ACUTE MASTITIS OF LEFT BREAST: Primary | ICD-10-CM

## 2018-07-06 DIAGNOSIS — R50.9 INTERMITTENT FEVER OF UNKNOWN ORIGIN: ICD-10-CM

## 2018-07-06 PROCEDURE — 3008F BODY MASS INDEX DOCD: CPT | Mod: CPTII,S$GLB,, | Performed by: NURSE PRACTITIONER

## 2018-07-06 PROCEDURE — 99999 PR PBB SHADOW E&M-EST. PATIENT-LVL III: CPT | Mod: PBBFAC,,, | Performed by: NURSE PRACTITIONER

## 2018-07-06 PROCEDURE — 99213 OFFICE O/P EST LOW 20 MIN: CPT | Mod: S$GLB,,, | Performed by: NURSE PRACTITIONER

## 2018-07-06 RX ORDER — CEPHALEXIN 500 MG/1
500 CAPSULE ORAL EVERY 6 HOURS
Qty: 28 CAPSULE | Refills: 0 | Status: SHIPPED | OUTPATIENT
Start: 2018-07-06 | End: 2018-07-13

## 2018-07-06 RX ORDER — NIFEDIPINE 90 MG/1
TABLET, FILM COATED, EXTENDED RELEASE ORAL
Refills: 11 | COMMUNITY
Start: 2018-06-14 | End: 2019-04-23 | Stop reason: SDUPTHER

## 2018-07-06 NOTE — TELEPHONE ENCOUNTER
Spoke with pt regarding a appt w/Dr Licona. Pt to see Dr Licona at [facility] 7/9/18 at 2:00 pm.

## 2018-07-06 NOTE — TELEPHONE ENCOUNTER
----- Message from Nancie Freeman NP sent at 7/6/2018  2:59 PM CDT -----  Please call patient ASAP to schedule consult with Dr. Licona - chronic mastitis (despite trtmt), and treated for yeast of breasts, but still having left breast mastitis and intermittent fevers of unknown origin; still nursing 18 month old son 3 x /day.    Thank you ,   SAMIA Fam (with Dr.Archana Woodard)

## 2018-07-09 ENCOUNTER — OFFICE VISIT (OUTPATIENT)
Dept: SURGERY | Facility: CLINIC | Age: 33
End: 2018-07-09
Payer: COMMERCIAL

## 2018-07-09 VITALS
HEIGHT: 67 IN | DIASTOLIC BLOOD PRESSURE: 85 MMHG | WEIGHT: 248.38 LBS | HEART RATE: 110 BPM | SYSTOLIC BLOOD PRESSURE: 139 MMHG | BODY MASS INDEX: 38.98 KG/M2 | TEMPERATURE: 97 F

## 2018-07-09 DIAGNOSIS — N64.4 MASTALGIA IN FEMALE: Primary | ICD-10-CM

## 2018-07-09 PROCEDURE — 99203 OFFICE O/P NEW LOW 30 MIN: CPT | Mod: S$GLB,,, | Performed by: SURGERY

## 2018-07-09 PROCEDURE — 3008F BODY MASS INDEX DOCD: CPT | Mod: CPTII,S$GLB,, | Performed by: SURGERY

## 2018-07-09 NOTE — PROGRESS NOTES
History and Physical  [facility]  Department of Surgery    CHIEF COMPLAINT: bilateral breast pain, recurrent mastitis      Subjective:      Ana Johnson is a 33 y.o. premenopausal female referred for evaluation of a breast abscess. Change was noted 2 months ago. Patient reports fever last week however none since that time. She had initially seen her ob/gyn in May for bilateral mastitis. At that time her infant had been diagnosed with strep and now has been diagnosed as a strep carrier- will require tonsillectomy. She also tested positive for strep. She was started on amoxicillin. She has also been placed on diflucan and dicloxacillin. Is currently on keflex for the last two days. An ultrasound was obtained which did not show any fluid collections. She states she did not notice any breast erythema or warmth when she initially presented. Her main complaint was burning pain after breast feeding with fevers. Last week she did have an episode of redness and tenderness in her left breast with fevers but this has since resolved. She states she has had this off and on since presentation to her ob/gyn in May.     Patient does routinely do self breast exams. Patient has not noted a change on breast exam.  Patient denies nipple discharge, is still actively breast feeding her 18 month old. Patient denies to previous breast biopsy. Patient denies a personal history of breast cancer.    GYN History:  Age of menarche was 10. Last menstrual period was 16. Patient denies hormonal therapy, did use OCPs for approximately 4 years. Patient is . Age of first live birth was 31. Patient did breast feed.    Breast cancer risk factors include delayed child bearing.     Family history:  Paternal grandfather: melanoma, diagnosed in late 70s;  not from melanoma  Denies any other family history of malignancy including ovarian, prostate, pancreatic, colon or gastric cancers.    Past Medical History:   Diagnosis  Date    Depression     stable Venlafaxine 300 mg qd, Celexa & Zoloft & Buspar & Wellbutrin did not help, Abilify did help as an additive    Gestational diabetes     Gestational diabetes     History of anorexia nervosa     high school    History of eating disorder     binge    HLD (hyperlipidemia) 2014    HTN (hypertension)     IBS (irritable bowel syndrome)     Irregular menses 2014    Obesity     Urticaria, cholinergic     worse with stress, Zyrtec prn     Past Surgical History:   Procedure Laterality Date     SECTION       Current Outpatient Prescriptions on File Prior to Visit   Medication Sig Dispense Refill    ARIPiprazole (ABILIFY) 5 MG Tab Take 5 mg by mouth every morning.  3    cephALEXin (KEFLEX) 500 MG capsule Take 1 capsule (500 mg total) by mouth every 6 (six) hours. for 7 days 28 capsule 0    NIFEdipine (ADALAT CC) 90 MG TbSR TAKE 1 TABLET (90 MG TOTAL) BY MOUTH ONCE DAILY.  11    venlafaxine (EFFEXOR-XR) 150 MG Cp24 Take 2 capsules (300 mg total) by mouth every morning. 180 capsule 3     No current facility-administered medications on file prior to visit.      Social History     Social History    Marital status:      Spouse name: N/A    Number of children: N/A    Years of education: N/A     Occupational History    Not on file.     Social History Main Topics    Smoking status: Never Smoker    Smokeless tobacco: Never Used    Alcohol use No      Comment: social use  - 1-2 drinks a month    Drug use: No    Sexual activity: Yes     Partners: Male     Birth control/ protection: None      Comment: natural family planning;      Other Topics Concern    Not on file     Social History Narrative    Psychologist with , jesuss Daquan, , 1 child, nonsmoker, ETOH socially, GYN Ronni, previous pap smears normal     Family History   Problem Relation Age of Onset    Diabetes Mother     Hypertension Mother     Hyperlipidemia Mother      Hypothyroidism Mother     Colon polyps Father     Hyperlipidemia Father     Stroke Maternal Grandmother     Hearing loss Maternal Grandmother     Hearing loss Maternal Grandfather     Heart disease Maternal Grandfather         Fatal MI @ 73    Hearing loss Paternal Grandfather     Heart disease Paternal Grandfather         fatal MI @ 82    Hyperlipidemia Brother     Premature birth Brother         34 week    Colon cancer Neg Hx     Breast cancer Neg Hx     Ovarian cancer Neg Hx     Cardiomyopathy Neg Hx     Congenital heart disease Neg Hx     Early death Neg Hx     Heart attacks under age 50 Neg Hx     Cancer Neg Hx        Review of Systems  Constitutional: negative for chills and fevers  Respiratory: negative for dyspnea on exertion and pleurisy/chest pain  Cardiovascular: negative for chest pain  Gastrointestinal: positive for diarrhea, negative for abdominal pain, bright red blood per rectum, nausea and vomiting  Genitourinary:negative for dysuria and hematuria  Integument/breast: positive for breast tenderness, negative for breast lump and skin color change  Neurological: negative for dizziness and headaches       Objective:        Physical Exam   Vitals reviewed.  Constitutional: She is oriented to person, place, and time. She appears well-developed and well-nourished.   HENT:   Head: Normocephalic and atraumatic.   Cardiovascular: Normal rate and regular rhythm.    Pulmonary/Chest: Effort normal and breath sounds normal. Right breast exhibits no inverted nipple, no mass, no nipple discharge, no skin change and no tenderness. Left breast exhibits no inverted nipple, no mass, no nipple discharge and no tenderness.       Abdominal: Soft. She exhibits no distension. There is no tenderness.   Neurological: She is alert and oriented to person, place, and time.   Skin: Skin is warm and dry.       Radiology review: Images personally reviewed by me in the clinic.  Ultrasound:  5/24/18  Findings:  There is no evidence of suspicious masses. Targeted ultrasound of the areas the patient pinpointed as the areas of concern was performed.  No abnormalities are present.       Impression:  No sonographic evidence of malignancy. A benign or negative imaging result should not preclude further clinical investigation of clinically suspicious symptoms.      BI-RADS Category 1: Negative Finding     Recommendation:  Annual mammogram is recommended beginning at age 40.      Assessment:      Ana Johnson is a 33 y.o. premenopausal female with bilateral breast pain with recurrent breast infections, currently breastfeeding     Plan:   We discussed the options for management of recurrent infections and bilateral breast pain. We discussed at length that both of her symptoms appear related to her breast feeding and would recommend either solely pumping to avoid mouth to breast contact with her 18 month old who is colonized with strep an has had 2 recent strep infections requiring antibiotics for treatment.  Alternatively, it would be reasonable to stop breast feeding at this time for relief of symptoms. She seems amenable to this plan given the age of her child and her symptoms. She understands that she is likely to continue to have recurrent symptoms if she continues breastfeeding, and is ok if she is willing to deal with that, but I do not recommend continuing to take a different antibiotic every few weeks to try to control it.  This is bad for her and potentially for the baby to breed resistant strains of strep.    Will see back in clinic as need if she develops additional symptoms.

## 2018-07-15 NOTE — PROGRESS NOTES
"Chief Complaint: Breast pain & redness, left     (Dr. Woodard patient)    Last Pap: 2017  Normal,  HPV negative    Last Mammogram/Breast Imagin18  (Breast u/s bilateral):   Impression:  No sonographic evidence of malignancy. A benign or negative imaging result should not preclude further clinical investigation of clinically suspicious symptoms.    HPI:      Ana Johnson is a 33 y.o.  who presents complaining of pain/tenderness to both breasts, but significantly more in left breast.  She also reports redness to left breast and an intermittent fever, up to 100.3, which was last week.   She is still breastfeeding her 18 month old, who has been treated twice for strep infections, and has herself been treated with oral antibiotics twice and 10-day course of antifungals as well since mid-May.   Patient has regular monthly menses.   No LMP recorded.       ROS:     GENERAL: Denies unintentional weight gain or weight loss. Feeling well overall.   ABDOMEN: Denies abdominal pain, constipation, diarrhea, nausea, vomiting, change in appetite.   URINARY: Denies frequency, dysuria, hematuria.  BREAST: See HPI  GYN: Denies abnormal bleeding or discharge.    Physical Exam:      PHYSICAL EXAM:  /64   Ht 5' 7" (1.702 m)   Wt 112.5 kg (248 lb 2 oz)   Breastfeeding? Yes   BMI 38.86 kg/m²   Body mass index is 38.86 kg/m².      APPEARANCE: Well nourished, well developed, in no acute distress.  BREASTS: Left breast:  Soft, mildly tender to palpation; no specific lumps/masses noted with exam.  An approx 14 x 11 cm area of erythema is noted extending approx 14 cm from the areola (see image).  No changes in skin texture and no abnormal nipple discharge noted.    Right breast:  There is no erythema, lumps or masses noted.  Mild tenderness noted with exam.  No changes in skin texture noted, and no nipple discharge noted.    ABDOMEN: Soft.  No tenderness or masses.    EXTREMITIES: No edema.       #1 Annotation: "  Extending approx 14 cm from the areola of the left breast, there is a 14 x 11 cm area of erythema and tenderness.    Assessment/Plan:   Acute mastitis of left breast  -     cephALEXin (KEFLEX) 500 MG capsule; Take 1 capsule (500 mg total) by mouth every 6 (six) hours. for 7 days  Dispense: 28 capsule; Refill: 0  -     Ambulatory Referral to Breast Surgery    Intermittent fever of unknown origin  -     cephALEXin (KEFLEX) 500 MG capsule; Take 1 capsule (500 mg total) by mouth every 6 (six) hours. for 7 days  Dispense: 28 capsule; Refill: 0  -     Ambulatory Referral to Breast Surgery        * Would like pt to follow-up with Breast Specialist for further evaluation of recurrent masatitis.  Follow-up if symptoms worsen or fail to improve.

## 2018-08-16 ENCOUNTER — OFFICE VISIT (OUTPATIENT)
Dept: OBSTETRICS AND GYNECOLOGY | Facility: CLINIC | Age: 33
End: 2018-08-16
Attending: OBSTETRICS & GYNECOLOGY
Payer: COMMERCIAL

## 2018-08-16 VITALS
SYSTOLIC BLOOD PRESSURE: 140 MMHG | HEIGHT: 67 IN | DIASTOLIC BLOOD PRESSURE: 88 MMHG | BODY MASS INDEX: 38.74 KG/M2 | WEIGHT: 246.81 LBS

## 2018-08-16 DIAGNOSIS — Z12.31 ENCOUNTER FOR MAMMOGRAM TO ESTABLISH BASELINE MAMMOGRAM: Primary | ICD-10-CM

## 2018-08-16 PROCEDURE — 99999 PR PBB SHADOW E&M-EST. PATIENT-LVL III: CPT | Mod: PBBFAC,,, | Performed by: OBSTETRICS & GYNECOLOGY

## 2018-08-16 PROCEDURE — 99395 PREV VISIT EST AGE 18-39: CPT | Mod: S$GLB,,, | Performed by: OBSTETRICS & GYNECOLOGY

## 2018-08-16 NOTE — PROGRESS NOTES
Subjective:       Patient ID: Ana Johnson is a 33 y.o. female.    Chief Complaint:  Annual Exam (last pap/hpv 17 normal/neg, breast U/S 18 birads 1)      Patient Active Problem List   Diagnosis    HLD (hyperlipidemia)    Irregular menses    IBS (irritable bowel syndrome)    Essential hypertension    BMI 40.0-44.9, adult    Metatarsalgia    Depression    Arrested active phase of labor    Contact dermatitis due to adhesive bandage    Gestational diabetes       History of Present Illness  33 y.o. yo  here for annual exam. No gyn complaints. Doing well. Declines birth control. Interested in another pregnancy. Depression well controlled as well as BP. All questions answered. Had mastitis which has completely resolved since she stopped nursing. rec screening MMG 6 months after she stopped breastfeeding.     Patient had a normal pap smear and HPV in 2017 at last annual visit. I explained new guidelines. Will repeat pap and HPV every 3 years. Answered all questions. Patient agrees.     Past Medical History:   Diagnosis Date    Depression     stable Venlafaxine 300 mg qd, Celexa & Zoloft & Buspar & Wellbutrin did not help, Abilify did help as an additive    Gestational diabetes     Gestational diabetes     History of anorexia nervosa     high school    History of eating disorder     binge    HLD (hyperlipidemia) 2014    HTN (hypertension)     IBS (irritable bowel syndrome)     Irregular menses 2014    Obesity     Urticaria, cholinergic     worse with stress, Zyrtec prn       Past Surgical History:   Procedure Laterality Date     SECTION         OB History    Para Term  AB Living   1 1 1 0 0 1   SAB TAB Ectopic Multiple Live Births   0 0 0 0 1      # Outcome Date GA Lbr Tommy/2nd Weight Sex Delivery Anes PTL Lv   1 Term 16 39w2d  3.95 kg (8 lb 11.3 oz) M CS-LTranv EPI N VENANCIO      Complications: Failure to Progress in First Stage           Patient's last menstrual period was 07/28/2018.   Date of Last Pap: 6/5/2017    Review of Systems  Review of Systems   Constitutional: Negative for fatigue and unexpected weight change.   Respiratory: Negative for shortness of breath.    Cardiovascular: Negative for chest pain.   Gastrointestinal: Negative for abdominal pain, constipation, diarrhea, nausea and vomiting.   Genitourinary: Negative for dysuria.   Musculoskeletal: Negative for back pain.   Skin: Negative for rash.   Neurological: Negative for headaches.   Hematological: Does not bruise/bleed easily.   Psychiatric/Behavioral: Negative for behavioral problems.        Objective:   Physical Exam:   Constitutional: She is oriented to person, place, and time. Vital signs are normal. She appears well-developed and well-nourished. No distress.        Pulmonary/Chest: She exhibits no mass. Right breast exhibits no mass, no nipple discharge, no skin change, no tenderness, no bleeding and no swelling. Left breast exhibits no mass, no nipple discharge, no skin change, no tenderness, no bleeding and no swelling. Breasts are symmetrical.        Abdominal: Soft. Normal appearance and bowel sounds are normal. She exhibits no distension and no mass. There is no tenderness. There is no rebound.     Genitourinary: Vagina normal and uterus normal. There is no rash, tenderness, lesion or injury on the right labia. There is no rash, tenderness, lesion or injury on the left labia. Uterus is not deviated, not enlarged, not fixed, not tender, not hosting fibroids and not experiencing uterine prolapse. Cervix is normal. Right adnexum displays no mass, no tenderness and no fullness. Left adnexum displays no mass, no tenderness and no fullness. No erythema, tenderness, rectocele, cystocele or unspecified prolapse of vaginal walls in the vagina. No vaginal discharge found. Cervix exhibits no motion tenderness, no discharge and no friability.           Musculoskeletal: Normal  range of motion and moves all extremeties.      Lymphadenopathy:     She has no axillary adenopathy.        Right: No supraclavicular adenopathy present.        Left: No supraclavicular adenopathy present.    Neurological: She is alert and oriented to person, place, and time.    Skin: Skin is warm and dry.    Psychiatric: She has a normal mood and affect. Her behavior is normal. Judgment normal.        Assessment/ Plan:     1. Encounter for mammogram to establish baseline mammogram  Mammo Digital Screening Bilat with [facility] CAD       Follow-up with me in 1 year

## 2018-10-19 DIAGNOSIS — E11.9 TYPE 2 DIABETES MELLITUS WITHOUT COMPLICATION: ICD-10-CM

## 2019-04-23 RX ORDER — NIFEDIPINE 90 MG/1
TABLET, FILM COATED, EXTENDED RELEASE ORAL
Qty: 30 TABLET | Refills: 0 | Status: SHIPPED | OUTPATIENT
Start: 2019-04-23 | End: 2019-04-25 | Stop reason: SDUPTHER

## 2019-04-25 ENCOUNTER — PATIENT MESSAGE (OUTPATIENT)
Dept: INTERNAL MEDICINE | Facility: CLINIC | Age: 34
End: 2019-04-25

## 2019-04-25 RX ORDER — NIFEDIPINE 90 MG/1
90 TABLET, FILM COATED, EXTENDED RELEASE ORAL DAILY
Qty: 30 TABLET | Refills: 0 | Status: SHIPPED | OUTPATIENT
Start: 2019-04-25 | End: 2019-06-24

## 2019-06-24 RX ORDER — NIFEDIPINE 90 MG/1
TABLET, FILM COATED, EXTENDED RELEASE ORAL
Qty: 30 TABLET | Refills: 0 | Status: SHIPPED | OUTPATIENT
Start: 2019-06-24

## 2020-10-05 ENCOUNTER — PATIENT MESSAGE (OUTPATIENT)
Dept: ADMINISTRATIVE | Facility: HOSPITAL | Age: 35
End: 2020-10-05

## 2021-01-04 ENCOUNTER — PATIENT MESSAGE (OUTPATIENT)
Dept: ADMINISTRATIVE | Facility: HOSPITAL | Age: 36
End: 2021-01-04

## 2021-04-05 ENCOUNTER — PATIENT MESSAGE (OUTPATIENT)
Dept: ADMINISTRATIVE | Facility: HOSPITAL | Age: 36
End: 2021-04-05